# Patient Record
Sex: FEMALE | Race: WHITE | NOT HISPANIC OR LATINO | Employment: UNEMPLOYED | URBAN - METROPOLITAN AREA
[De-identification: names, ages, dates, MRNs, and addresses within clinical notes are randomized per-mention and may not be internally consistent; named-entity substitution may affect disease eponyms.]

---

## 2017-03-26 ENCOUNTER — APPOINTMENT (OUTPATIENT)
Dept: GENERAL RADIOLOGY | Facility: HOSPITAL | Age: 72
End: 2017-03-26

## 2017-03-26 ENCOUNTER — HOSPITAL ENCOUNTER (INPATIENT)
Facility: HOSPITAL | Age: 72
LOS: 13 days | Discharge: NURSING, REHAB OR LONG-TERM CARE | End: 2017-04-08
Attending: EMERGENCY MEDICINE | Admitting: INTERNAL MEDICINE

## 2017-03-26 DIAGNOSIS — J96.02 ACUTE RESPIRATORY FAILURE WITH HYPERCAPNIA (HCC): Primary | ICD-10-CM

## 2017-03-26 DIAGNOSIS — J44.1 COPD EXACERBATION (HCC): ICD-10-CM

## 2017-03-26 LAB
ALBUMIN SERPL-MCNC: 3.8 G/DL (ref 3.5–5.2)
ALBUMIN/GLOB SERPL: 1.4 G/DL
ALP SERPL-CCNC: 68 U/L (ref 39–117)
ALT SERPL W P-5'-P-CCNC: 15 U/L (ref 1–33)
ANION GAP SERPL CALCULATED.3IONS-SCNC: 6.3 MMOL/L
ARTERIAL PATENCY WRIST A: NEGATIVE
ARTERIAL PATENCY WRIST A: POSITIVE
AST SERPL-CCNC: 10 U/L (ref 1–32)
ATMOSPHERIC PRESS: 744.8 MMHG
ATMOSPHERIC PRESS: 750 MMHG
B PERT DNA SPEC QL NAA+PROBE: NOT DETECTED
BASE EXCESS BLDA CALC-SCNC: 10.9 MMOL/L (ref 0–2)
BASE EXCESS BLDA CALC-SCNC: 12.3 MMOL/L (ref 0–2)
BASOPHILS # BLD AUTO: 0.01 10*3/MM3 (ref 0–0.2)
BASOPHILS NFR BLD AUTO: 0.1 % (ref 0–1.5)
BDY SITE: ABNORMAL
BDY SITE: ABNORMAL
BILIRUB SERPL-MCNC: <0.2 MG/DL (ref 0.1–1.2)
BUN BLD-MCNC: 28 MG/DL (ref 8–23)
BUN/CREAT SERPL: 20.6 (ref 7–25)
C PNEUM DNA NPH QL NAA+NON-PROBE: NOT DETECTED
CALCIUM SPEC-SCNC: 8.9 MG/DL (ref 8.6–10.5)
CHLORIDE SERPL-SCNC: 102 MMOL/L (ref 98–107)
CO2 SERPL-SCNC: 40.7 MMOL/L (ref 22–29)
CREAT BLD-MCNC: 1.36 MG/DL (ref 0.57–1)
DEPRECATED RDW RBC AUTO: 52 FL (ref 37–54)
EOSINOPHIL # BLD AUTO: 0.2 10*3/MM3 (ref 0–0.7)
EOSINOPHIL NFR BLD AUTO: 1.8 % (ref 0.3–6.2)
ERYTHROCYTE [DISTWIDTH] IN BLOOD BY AUTOMATED COUNT: 14.2 % (ref 11.7–13)
FLUAV H1 2009 PAND RNA NPH QL NAA+PROBE: NOT DETECTED
FLUAV H1 HA GENE NPH QL NAA+PROBE: NOT DETECTED
FLUAV H3 RNA NPH QL NAA+PROBE: NOT DETECTED
FLUAV SUBTYP SPEC NAA+PROBE: NOT DETECTED
FLUBV RNA ISLT QL NAA+PROBE: NOT DETECTED
GAS FLOW AIRWAY: 4 LPM
GFR SERPL CREATININE-BSD FRML MDRD: 38 ML/MIN/1.73
GLOBULIN UR ELPH-MCNC: 2.8 GM/DL
GLUCOSE BLD-MCNC: 118 MG/DL (ref 65–99)
GLUCOSE BLDC GLUCOMTR-MCNC: 185 MG/DL (ref 70–130)
HADV DNA SPEC NAA+PROBE: NOT DETECTED
HCO3 BLDA-SCNC: 40.1 MMOL/L (ref 22–28)
HCO3 BLDA-SCNC: 41.3 MMOL/L (ref 22–28)
HCOV 229E RNA SPEC QL NAA+PROBE: NOT DETECTED
HCOV HKU1 RNA SPEC QL NAA+PROBE: NOT DETECTED
HCOV NL63 RNA SPEC QL NAA+PROBE: NOT DETECTED
HCOV OC43 RNA SPEC QL NAA+PROBE: NOT DETECTED
HCT VFR BLD AUTO: 39.4 % (ref 35.6–45.5)
HGB BLD-MCNC: 11.2 G/DL (ref 11.9–15.5)
HMPV RNA NPH QL NAA+NON-PROBE: NOT DETECTED
HOLD SPECIMEN: NORMAL
HOLD SPECIMEN: NORMAL
HOROWITZ INDEX BLD+IHG-RTO: 30 %
HPIV1 RNA SPEC QL NAA+PROBE: NOT DETECTED
HPIV2 RNA SPEC QL NAA+PROBE: NOT DETECTED
HPIV3 RNA NPH QL NAA+PROBE: NOT DETECTED
HPIV4 P GENE NPH QL NAA+PROBE: NOT DETECTED
IMM GRANULOCYTES # BLD: 0.07 10*3/MM3 (ref 0–0.03)
IMM GRANULOCYTES NFR BLD: 0.6 % (ref 0–0.5)
INR PPP: 0.97 (ref 0.9–1.1)
LYMPHOCYTES # BLD AUTO: 1.7 10*3/MM3 (ref 0.9–4.8)
LYMPHOCYTES NFR BLD AUTO: 15.1 % (ref 19.6–45.3)
M PNEUMO IGG SER IA-ACNC: NOT DETECTED
MCH RBC QN AUTO: 28.5 PG (ref 26.9–32)
MCHC RBC AUTO-ENTMCNC: 28.4 G/DL (ref 32.4–36.3)
MCV RBC AUTO: 100.3 FL (ref 80.5–98.2)
MODALITY: ABNORMAL
MODALITY: ABNORMAL
MONOCYTES # BLD AUTO: 0.67 10*3/MM3 (ref 0.2–1.2)
MONOCYTES NFR BLD AUTO: 5.9 % (ref 5–12)
NEUTROPHILS # BLD AUTO: 8.64 10*3/MM3 (ref 1.9–8.1)
NEUTROPHILS NFR BLD AUTO: 76.5 % (ref 42.7–76)
NT-PROBNP SERPL-MCNC: 109.9 PG/ML (ref 0–900)
O2 A-A PPRESDIFF RESPIRATORY: 0.5 MMHG
PCO2 BLDA: 74.6 MM HG (ref 35–45)
PCO2 BLDA: 77.4 MM HG (ref 35–45)
PH BLDA: 7.32 PH UNITS (ref 7.35–7.45)
PH BLDA: 7.35 PH UNITS (ref 7.35–7.45)
PLATELET # BLD AUTO: 203 10*3/MM3 (ref 140–500)
PMV BLD AUTO: 10.3 FL (ref 6–12)
PO2 BLDA: 58.1 MM HG (ref 80–100)
PO2 BLDA: 59.1 MM HG (ref 80–100)
POTASSIUM BLD-SCNC: 4.7 MMOL/L (ref 3.5–5.2)
PROCALCITONIN SERPL-MCNC: 0.05 NG/ML (ref 0.1–0.25)
PROT SERPL-MCNC: 6.6 G/DL (ref 6–8.5)
PROTHROMBIN TIME: 12.5 SECONDS (ref 11.7–14.2)
RBC # BLD AUTO: 3.93 10*6/MM3 (ref 3.9–5.2)
RHINOVIRUS RNA SPEC NAA+PROBE: NOT DETECTED
RSV RNA NPH QL NAA+NON-PROBE: NOT DETECTED
SAO2 % BLDCOA: 86.3 % (ref 92–99)
SAO2 % BLDCOA: 86.7 % (ref 92–99)
SET MECH RESP RATE: 18
SET MECH RESP RATE: 22
SODIUM BLD-SCNC: 149 MMOL/L (ref 136–145)
TOTAL RATE: 19 BREATHS/MINUTE
TROPONIN T SERPL-MCNC: <0.01 NG/ML (ref 0–0.03)
VALPROATE SERPL-MCNC: 10 MCG/ML (ref 50–125)
VT ON VENT VENT: 461 ML
WBC NRBC COR # BLD: 11.29 10*3/MM3 (ref 4.5–10.7)
WHOLE BLOOD HOLD SPECIMEN: NORMAL
WHOLE BLOOD HOLD SPECIMEN: NORMAL

## 2017-03-26 PROCEDURE — 85025 COMPLETE CBC W/AUTO DIFF WBC: CPT | Performed by: EMERGENCY MEDICINE

## 2017-03-26 PROCEDURE — 80053 COMPREHEN METABOLIC PANEL: CPT | Performed by: EMERGENCY MEDICINE

## 2017-03-26 PROCEDURE — 80164 ASSAY DIPROPYLACETIC ACD TOT: CPT | Performed by: EMERGENCY MEDICINE

## 2017-03-26 PROCEDURE — 84145 PROCALCITONIN (PCT): CPT | Performed by: EMERGENCY MEDICINE

## 2017-03-26 PROCEDURE — 93010 ELECTROCARDIOGRAM REPORT: CPT | Performed by: INTERNAL MEDICINE

## 2017-03-26 PROCEDURE — 36600 WITHDRAWAL OF ARTERIAL BLOOD: CPT

## 2017-03-26 PROCEDURE — 87798 DETECT AGENT NOS DNA AMP: CPT | Performed by: INTERNAL MEDICINE

## 2017-03-26 PROCEDURE — 87581 M.PNEUMON DNA AMP PROBE: CPT | Performed by: INTERNAL MEDICINE

## 2017-03-26 PROCEDURE — 25010000002 METHYLPREDNISOLONE PER 125 MG: Performed by: EMERGENCY MEDICINE

## 2017-03-26 PROCEDURE — 84484 ASSAY OF TROPONIN QUANT: CPT | Performed by: EMERGENCY MEDICINE

## 2017-03-26 PROCEDURE — 94799 UNLISTED PULMONARY SVC/PX: CPT

## 2017-03-26 PROCEDURE — 25010000002 LEVOFLOXACIN PER 250 MG: Performed by: INTERNAL MEDICINE

## 2017-03-26 PROCEDURE — 99291 CRITICAL CARE FIRST HOUR: CPT

## 2017-03-26 PROCEDURE — 5A09357 ASSISTANCE WITH RESPIRATORY VENTILATION, LESS THAN 24 CONSECUTIVE HOURS, CONTINUOUS POSITIVE AIRWAY PRESSURE: ICD-10-PCS | Performed by: INTERNAL MEDICINE

## 2017-03-26 PROCEDURE — 25010000002 FUROSEMIDE PER 20 MG: Performed by: EMERGENCY MEDICINE

## 2017-03-26 PROCEDURE — 83880 ASSAY OF NATRIURETIC PEPTIDE: CPT | Performed by: EMERGENCY MEDICINE

## 2017-03-26 PROCEDURE — 85610 PROTHROMBIN TIME: CPT | Performed by: EMERGENCY MEDICINE

## 2017-03-26 PROCEDURE — 93005 ELECTROCARDIOGRAM TRACING: CPT | Performed by: EMERGENCY MEDICINE

## 2017-03-26 PROCEDURE — 94660 CPAP INITIATION&MGMT: CPT

## 2017-03-26 PROCEDURE — 25010000002 ENOXAPARIN PER 10 MG: Performed by: INTERNAL MEDICINE

## 2017-03-26 PROCEDURE — 82803 BLOOD GASES ANY COMBINATION: CPT

## 2017-03-26 PROCEDURE — 94640 AIRWAY INHALATION TREATMENT: CPT

## 2017-03-26 PROCEDURE — 87486 CHLMYD PNEUM DNA AMP PROBE: CPT | Performed by: INTERNAL MEDICINE

## 2017-03-26 PROCEDURE — 87633 RESP VIRUS 12-25 TARGETS: CPT | Performed by: INTERNAL MEDICINE

## 2017-03-26 PROCEDURE — 82962 GLUCOSE BLOOD TEST: CPT

## 2017-03-26 PROCEDURE — 71020 HC CHEST PA AND LATERAL: CPT

## 2017-03-26 RX ORDER — DULOXETIN HYDROCHLORIDE 30 MG/1
30 CAPSULE, DELAYED RELEASE ORAL DAILY
Status: DISCONTINUED | OUTPATIENT
Start: 2017-03-26 | End: 2017-04-08 | Stop reason: HOSPADM

## 2017-03-26 RX ORDER — DIVALPROEX SODIUM 125 MG/1
125 CAPSULE, COATED PELLETS ORAL DAILY
Status: DISCONTINUED | OUTPATIENT
Start: 2017-03-26 | End: 2017-04-08 | Stop reason: HOSPADM

## 2017-03-26 RX ORDER — IPRATROPIUM BROMIDE AND ALBUTEROL SULFATE 2.5; .5 MG/3ML; MG/3ML
3 SOLUTION RESPIRATORY (INHALATION) EVERY 4 HOURS PRN
COMMUNITY
End: 2017-03-26

## 2017-03-26 RX ORDER — LEVOTHYROXINE SODIUM 0.05 MG/1
50 TABLET ORAL
Status: DISCONTINUED | OUTPATIENT
Start: 2017-03-27 | End: 2017-04-08 | Stop reason: HOSPADM

## 2017-03-26 RX ORDER — IPRATROPIUM BROMIDE AND ALBUTEROL SULFATE 2.5; .5 MG/3ML; MG/3ML
3 SOLUTION RESPIRATORY (INHALATION)
Status: DISCONTINUED | OUTPATIENT
Start: 2017-03-26 | End: 2017-04-05

## 2017-03-26 RX ORDER — SODIUM CHLORIDE 0.9 % (FLUSH) 0.9 %
10 SYRINGE (ML) INJECTION AS NEEDED
Status: DISCONTINUED | OUTPATIENT
Start: 2017-03-26 | End: 2017-03-29

## 2017-03-26 RX ORDER — DOCUSATE SODIUM 100 MG/1
100 CAPSULE, LIQUID FILLED ORAL 2 TIMES DAILY
Status: DISCONTINUED | OUTPATIENT
Start: 2017-03-26 | End: 2017-04-06

## 2017-03-26 RX ORDER — ROPINIROLE 0.25 MG/1
0.25 TABLET, FILM COATED ORAL 3 TIMES DAILY
COMMUNITY

## 2017-03-26 RX ORDER — PANTOPRAZOLE SODIUM 40 MG/1
40 TABLET, DELAYED RELEASE ORAL
Status: DISCONTINUED | OUTPATIENT
Start: 2017-03-27 | End: 2017-04-08 | Stop reason: HOSPADM

## 2017-03-26 RX ORDER — IPRATROPIUM BROMIDE AND ALBUTEROL SULFATE 2.5; .5 MG/3ML; MG/3ML
3 SOLUTION RESPIRATORY (INHALATION) EVERY 4 HOURS PRN
Status: DISCONTINUED | OUTPATIENT
Start: 2017-03-26 | End: 2017-04-06

## 2017-03-26 RX ORDER — ALBUTEROL SULFATE 2.5 MG/3ML
2.5 SOLUTION RESPIRATORY (INHALATION)
Status: COMPLETED | OUTPATIENT
Start: 2017-03-26 | End: 2017-03-26

## 2017-03-26 RX ORDER — IPRATROPIUM BROMIDE AND ALBUTEROL SULFATE 2.5; .5 MG/3ML; MG/3ML
3 SOLUTION RESPIRATORY (INHALATION)
COMMUNITY
Start: 2016-01-11 | End: 2017-04-08 | Stop reason: HOSPADM

## 2017-03-26 RX ORDER — IPRATROPIUM BROMIDE AND ALBUTEROL SULFATE 2.5; .5 MG/3ML; MG/3ML
3 SOLUTION RESPIRATORY (INHALATION) ONCE
Status: COMPLETED | OUTPATIENT
Start: 2017-03-26 | End: 2017-03-26

## 2017-03-26 RX ORDER — CETIRIZINE HYDROCHLORIDE 10 MG/1
10 TABLET ORAL DAILY
COMMUNITY
End: 2017-04-08 | Stop reason: HOSPADM

## 2017-03-26 RX ORDER — BUMETANIDE 0.25 MG/ML
2 INJECTION INTRAMUSCULAR; INTRAVENOUS EVERY 12 HOURS
Status: COMPLETED | OUTPATIENT
Start: 2017-03-26 | End: 2017-03-27

## 2017-03-26 RX ORDER — LEVOFLOXACIN 5 MG/ML
750 INJECTION, SOLUTION INTRAVENOUS EVERY 24 HOURS
Status: DISCONTINUED | OUTPATIENT
Start: 2017-03-26 | End: 2017-03-27

## 2017-03-26 RX ORDER — ROPINIROLE 0.25 MG/1
0.25 TABLET, FILM COATED ORAL 3 TIMES DAILY
Status: DISCONTINUED | OUTPATIENT
Start: 2017-03-26 | End: 2017-04-08 | Stop reason: HOSPADM

## 2017-03-26 RX ORDER — SIMVASTATIN 10 MG
10 TABLET ORAL NIGHTLY
COMMUNITY

## 2017-03-26 RX ORDER — SODIUM CHLORIDE 0.9 % (FLUSH) 0.9 %
10 SYRINGE (ML) INJECTION AS NEEDED
Status: DISCONTINUED | OUTPATIENT
Start: 2017-03-26 | End: 2017-04-08 | Stop reason: HOSPADM

## 2017-03-26 RX ORDER — FUROSEMIDE 10 MG/ML
40 INJECTION INTRAMUSCULAR; INTRAVENOUS ONCE
Status: COMPLETED | OUTPATIENT
Start: 2017-03-26 | End: 2017-03-26

## 2017-03-26 RX ORDER — AMLODIPINE BESYLATE 5 MG/1
5 TABLET ORAL DAILY
Status: DISCONTINUED | OUTPATIENT
Start: 2017-03-26 | End: 2017-04-08 | Stop reason: HOSPADM

## 2017-03-26 RX ORDER — METHYLPREDNISOLONE SODIUM SUCCINATE 125 MG/2ML
125 INJECTION, POWDER, LYOPHILIZED, FOR SOLUTION INTRAMUSCULAR; INTRAVENOUS ONCE
Status: COMPLETED | OUTPATIENT
Start: 2017-03-26 | End: 2017-03-26

## 2017-03-26 RX ORDER — SODIUM CHLORIDE 0.9 % (FLUSH) 0.9 %
1-10 SYRINGE (ML) INJECTION AS NEEDED
Status: DISCONTINUED | OUTPATIENT
Start: 2017-03-26 | End: 2017-04-08 | Stop reason: HOSPADM

## 2017-03-26 RX ADMIN — DULOXETINE HYDROCHLORIDE 30 MG: 30 CAPSULE, DELAYED RELEASE ORAL at 17:56

## 2017-03-26 RX ADMIN — VANCOMYCIN HYDROCHLORIDE 2500 MG: 1 INJECTION, POWDER, LYOPHILIZED, FOR SOLUTION INTRAVENOUS at 19:34

## 2017-03-26 RX ADMIN — ROPINIROLE HYDROCHLORIDE 0.25 MG: 0.25 TABLET, FILM COATED ORAL at 20:17

## 2017-03-26 RX ADMIN — IPRATROPIUM BROMIDE AND ALBUTEROL SULFATE 3 ML: .5; 3 SOLUTION RESPIRATORY (INHALATION) at 20:16

## 2017-03-26 RX ADMIN — ALBUTEROL SULFATE 2.5 MG: 2.5 SOLUTION RESPIRATORY (INHALATION) at 11:11

## 2017-03-26 RX ADMIN — IPRATROPIUM BROMIDE AND ALBUTEROL SULFATE 3 ML: .5; 3 SOLUTION RESPIRATORY (INHALATION) at 23:28

## 2017-03-26 RX ADMIN — DOCUSATE SODIUM 100 MG: 100 CAPSULE, LIQUID FILLED ORAL at 17:55

## 2017-03-26 RX ADMIN — IPRATROPIUM BROMIDE AND ALBUTEROL SULFATE 3 ML: .5; 3 SOLUTION RESPIRATORY (INHALATION) at 17:11

## 2017-03-26 RX ADMIN — ENOXAPARIN SODIUM 40 MG: 40 INJECTION SUBCUTANEOUS at 20:17

## 2017-03-26 RX ADMIN — BUMETANIDE 2 MG: 0.25 INJECTION, SOLUTION INTRAMUSCULAR; INTRAVENOUS at 17:55

## 2017-03-26 RX ADMIN — METHYLPREDNISOLONE SODIUM SUCCINATE 125 MG: 125 INJECTION, POWDER, FOR SOLUTION INTRAMUSCULAR; INTRAVENOUS at 11:59

## 2017-03-26 RX ADMIN — FUROSEMIDE 40 MG: 10 INJECTION, SOLUTION INTRAMUSCULAR; INTRAVENOUS at 12:20

## 2017-03-26 RX ADMIN — AMLODIPINE BESYLATE 5 MG: 5 TABLET ORAL at 17:56

## 2017-03-26 RX ADMIN — ALBUTEROL SULFATE 2.5 MG: 2.5 SOLUTION RESPIRATORY (INHALATION) at 12:03

## 2017-03-26 RX ADMIN — IPRATROPIUM BROMIDE AND ALBUTEROL SULFATE 3 ML: .5; 3 SOLUTION RESPIRATORY (INHALATION) at 11:11

## 2017-03-26 RX ADMIN — ROPINIROLE HYDROCHLORIDE 0.25 MG: 0.25 TABLET, FILM COATED ORAL at 17:56

## 2017-03-26 RX ADMIN — LEVOFLOXACIN 750 MG: 5 INJECTION, SOLUTION INTRAVENOUS at 17:55

## 2017-03-26 RX ADMIN — DIVALPROEX SODIUM 125 MG: 125 CAPSULE, COATED PELLETS ORAL at 17:55

## 2017-03-27 ENCOUNTER — APPOINTMENT (OUTPATIENT)
Dept: GENERAL RADIOLOGY | Facility: HOSPITAL | Age: 72
End: 2017-03-27

## 2017-03-27 LAB
ANION GAP SERPL CALCULATED.3IONS-SCNC: 9.3 MMOL/L
ARTERIAL PATENCY WRIST A: POSITIVE
ARTERIAL PATENCY WRIST A: POSITIVE
ATMOSPHERIC PRESS: 745.7 MMHG
ATMOSPHERIC PRESS: 746.3 MMHG
BASE EXCESS BLDA CALC-SCNC: 19.4 MMOL/L (ref 0–2)
BASE EXCESS BLDA CALC-SCNC: 20.6 MMOL/L (ref 0–2)
BDY SITE: ABNORMAL
BDY SITE: ABNORMAL
BUN BLD-MCNC: 31 MG/DL (ref 8–23)
BUN/CREAT SERPL: 26.3 (ref 7–25)
CALCIUM SPEC-SCNC: 8.8 MG/DL (ref 8.6–10.5)
CHLORIDE SERPL-SCNC: 98 MMOL/L (ref 98–107)
CO2 SERPL-SCNC: 41.7 MMOL/L (ref 22–29)
CREAT BLD-MCNC: 1.18 MG/DL (ref 0.57–1)
DEPRECATED RDW RBC AUTO: 48.7 FL (ref 37–54)
ERYTHROCYTE [DISTWIDTH] IN BLOOD BY AUTOMATED COUNT: 13.9 % (ref 11.7–13)
GAS FLOW AIRWAY: 4 LPM
GFR SERPL CREATININE-BSD FRML MDRD: 45 ML/MIN/1.73
GLUCOSE BLD-MCNC: 96 MG/DL (ref 65–99)
GLUCOSE BLDC GLUCOMTR-MCNC: 95 MG/DL (ref 70–130)
GLUCOSE BLDC GLUCOMTR-MCNC: 99 MG/DL (ref 70–130)
HCO3 BLDA-SCNC: 45.8 MMOL/L (ref 22–28)
HCO3 BLDA-SCNC: 47.7 MMOL/L (ref 22–28)
HCT VFR BLD AUTO: 36.8 % (ref 35.6–45.5)
HGB BLD-MCNC: 10.5 G/DL (ref 11.9–15.5)
HOROWITZ INDEX BLD+IHG-RTO: 40 %
MCH RBC QN AUTO: 27.7 PG (ref 26.9–32)
MCHC RBC AUTO-ENTMCNC: 28.5 G/DL (ref 32.4–36.3)
MCV RBC AUTO: 97.1 FL (ref 80.5–98.2)
MODALITY: ABNORMAL
MODALITY: ABNORMAL
NT-PROBNP SERPL-MCNC: 91.5 PG/ML (ref 5–900)
O2 A-A PPRESDIFF RESPIRATORY: 0.4 MMHG
PCO2 BLDA: 60 MM HG (ref 35–45)
PCO2 BLDA: 65 MM HG (ref 35–45)
PH BLDA: 7.47 PH UNITS (ref 7.35–7.45)
PH BLDA: 7.49 PH UNITS (ref 7.35–7.45)
PLATELET # BLD AUTO: 192 10*3/MM3 (ref 140–500)
PMV BLD AUTO: 10.7 FL (ref 6–12)
PO2 BLDA: 61.4 MM HG (ref 80–100)
PO2 BLDA: 87 MM HG (ref 80–100)
POTASSIUM BLD-SCNC: 4.1 MMOL/L (ref 3.5–5.2)
PROCALCITONIN SERPL-MCNC: 0.05 NG/ML (ref 0.1–0.25)
RBC # BLD AUTO: 3.79 10*6/MM3 (ref 3.9–5.2)
SAO2 % BLDCOA: 92 % (ref 92–99)
SAO2 % BLDCOA: 96.8 % (ref 92–99)
SET MECH RESP RATE: 18
SODIUM BLD-SCNC: 149 MMOL/L (ref 136–145)
TOTAL RATE: 20 BREATHS/MINUTE
TOTAL RATE: 21 BREATHS/MINUTE
VANCOMYCIN SERPL-MCNC: 18.1 MCG/ML (ref 5–40)
VT ON VENT VENT: 424 ML
WBC NRBC COR # BLD: 9.12 10*3/MM3 (ref 4.5–10.7)

## 2017-03-27 PROCEDURE — 83880 ASSAY OF NATRIURETIC PEPTIDE: CPT | Performed by: INTERNAL MEDICINE

## 2017-03-27 PROCEDURE — 94799 UNLISTED PULMONARY SVC/PX: CPT

## 2017-03-27 PROCEDURE — 82962 GLUCOSE BLOOD TEST: CPT

## 2017-03-27 PROCEDURE — 36600 WITHDRAWAL OF ARTERIAL BLOOD: CPT

## 2017-03-27 PROCEDURE — 94660 CPAP INITIATION&MGMT: CPT

## 2017-03-27 PROCEDURE — 71010 HC CHEST PA OR AP: CPT

## 2017-03-27 PROCEDURE — 80202 ASSAY OF VANCOMYCIN: CPT | Performed by: INTERNAL MEDICINE

## 2017-03-27 PROCEDURE — 82803 BLOOD GASES ANY COMBINATION: CPT

## 2017-03-27 PROCEDURE — 85027 COMPLETE CBC AUTOMATED: CPT | Performed by: INTERNAL MEDICINE

## 2017-03-27 PROCEDURE — 84145 PROCALCITONIN (PCT): CPT | Performed by: INTERNAL MEDICINE

## 2017-03-27 PROCEDURE — 80048 BASIC METABOLIC PNL TOTAL CA: CPT | Performed by: INTERNAL MEDICINE

## 2017-03-27 PROCEDURE — 25010000002 ENOXAPARIN PER 10 MG: Performed by: INTERNAL MEDICINE

## 2017-03-27 RX ORDER — ACETAMINOPHEN 325 MG/1
650 TABLET ORAL EVERY 4 HOURS PRN
Status: DISCONTINUED | OUTPATIENT
Start: 2017-03-27 | End: 2017-04-08 | Stop reason: HOSPADM

## 2017-03-27 RX ORDER — BUMETANIDE 0.25 MG/ML
2 INJECTION INTRAMUSCULAR; INTRAVENOUS EVERY 12 HOURS
Status: COMPLETED | OUTPATIENT
Start: 2017-03-27 | End: 2017-03-28

## 2017-03-27 RX ADMIN — ROPINIROLE HYDROCHLORIDE 0.25 MG: 0.25 TABLET, FILM COATED ORAL at 16:33

## 2017-03-27 RX ADMIN — ROPINIROLE HYDROCHLORIDE 0.25 MG: 0.25 TABLET, FILM COATED ORAL at 20:40

## 2017-03-27 RX ADMIN — IPRATROPIUM BROMIDE AND ALBUTEROL SULFATE 3 ML: .5; 3 SOLUTION RESPIRATORY (INHALATION) at 03:27

## 2017-03-27 RX ADMIN — ACETAMINOPHEN 650 MG: 325 TABLET ORAL at 13:35

## 2017-03-27 RX ADMIN — DULOXETINE HYDROCHLORIDE 30 MG: 30 CAPSULE, DELAYED RELEASE ORAL at 08:26

## 2017-03-27 RX ADMIN — DIVALPROEX SODIUM 125 MG: 125 CAPSULE, COATED PELLETS ORAL at 08:26

## 2017-03-27 RX ADMIN — ACETAMINOPHEN 650 MG: 325 TABLET ORAL at 20:40

## 2017-03-27 RX ADMIN — IPRATROPIUM BROMIDE AND ALBUTEROL SULFATE 3 ML: .5; 3 SOLUTION RESPIRATORY (INHALATION) at 20:25

## 2017-03-27 RX ADMIN — IPRATROPIUM BROMIDE AND ALBUTEROL SULFATE 3 ML: .5; 3 SOLUTION RESPIRATORY (INHALATION) at 11:54

## 2017-03-27 RX ADMIN — DOCUSATE SODIUM 100 MG: 100 CAPSULE, LIQUID FILLED ORAL at 08:26

## 2017-03-27 RX ADMIN — LEVOTHYROXINE SODIUM 50 MCG: 50 TABLET ORAL at 06:09

## 2017-03-27 RX ADMIN — IPRATROPIUM BROMIDE AND ALBUTEROL SULFATE 3 ML: .5; 3 SOLUTION RESPIRATORY (INHALATION) at 07:37

## 2017-03-27 RX ADMIN — IPRATROPIUM BROMIDE AND ALBUTEROL SULFATE 3 ML: .5; 3 SOLUTION RESPIRATORY (INHALATION) at 16:02

## 2017-03-27 RX ADMIN — AMLODIPINE BESYLATE 5 MG: 5 TABLET ORAL at 08:26

## 2017-03-27 RX ADMIN — ENOXAPARIN SODIUM 40 MG: 40 INJECTION SUBCUTANEOUS at 09:15

## 2017-03-27 RX ADMIN — DOCUSATE SODIUM 100 MG: 100 CAPSULE, LIQUID FILLED ORAL at 19:03

## 2017-03-27 RX ADMIN — ENOXAPARIN SODIUM 40 MG: 40 INJECTION SUBCUTANEOUS at 20:40

## 2017-03-27 RX ADMIN — ROPINIROLE HYDROCHLORIDE 0.25 MG: 0.25 TABLET, FILM COATED ORAL at 08:26

## 2017-03-27 RX ADMIN — PANTOPRAZOLE SODIUM 40 MG: 40 TABLET, DELAYED RELEASE ORAL at 06:09

## 2017-03-27 RX ADMIN — BUMETANIDE 2 MG: 0.25 INJECTION, SOLUTION INTRAMUSCULAR; INTRAVENOUS at 05:37

## 2017-03-27 RX ADMIN — BUMETANIDE 2 MG: 0.25 INJECTION, SOLUTION INTRAMUSCULAR; INTRAVENOUS at 16:33

## 2017-03-28 LAB
ANION GAP SERPL CALCULATED.3IONS-SCNC: 13.7 MMOL/L
BUN BLD-MCNC: 27 MG/DL (ref 8–23)
BUN/CREAT SERPL: 22.5 (ref 7–25)
CALCIUM SPEC-SCNC: 9.1 MG/DL (ref 8.6–10.5)
CHLORIDE SERPL-SCNC: 91 MMOL/L (ref 98–107)
CO2 SERPL-SCNC: 41.3 MMOL/L (ref 22–29)
CREAT BLD-MCNC: 1.2 MG/DL (ref 0.57–1)
GFR SERPL CREATININE-BSD FRML MDRD: 44 ML/MIN/1.73
GLUCOSE BLD-MCNC: 98 MG/DL (ref 65–99)
MAGNESIUM SERPL-MCNC: 1.8 MG/DL (ref 1.6–2.4)
POTASSIUM BLD-SCNC: 3.3 MMOL/L (ref 3.5–5.2)
SODIUM BLD-SCNC: 146 MMOL/L (ref 136–145)

## 2017-03-28 PROCEDURE — 94660 CPAP INITIATION&MGMT: CPT

## 2017-03-28 PROCEDURE — 80048 BASIC METABOLIC PNL TOTAL CA: CPT | Performed by: INTERNAL MEDICINE

## 2017-03-28 PROCEDURE — 94799 UNLISTED PULMONARY SVC/PX: CPT

## 2017-03-28 PROCEDURE — 25010000002 ENOXAPARIN PER 10 MG: Performed by: INTERNAL MEDICINE

## 2017-03-28 PROCEDURE — 83735 ASSAY OF MAGNESIUM: CPT | Performed by: INTERNAL MEDICINE

## 2017-03-28 RX ORDER — MAGNESIUM SULFATE HEPTAHYDRATE 40 MG/ML
2 INJECTION, SOLUTION INTRAVENOUS AS NEEDED
Status: DISCONTINUED | OUTPATIENT
Start: 2017-03-28 | End: 2017-04-08 | Stop reason: HOSPADM

## 2017-03-28 RX ORDER — POTASSIUM CHLORIDE 750 MG/1
40 CAPSULE, EXTENDED RELEASE ORAL AS NEEDED
Status: DISCONTINUED | OUTPATIENT
Start: 2017-03-28 | End: 2017-04-08 | Stop reason: HOSPADM

## 2017-03-28 RX ORDER — POTASSIUM CHLORIDE 1.5 G/1.77G
40 POWDER, FOR SOLUTION ORAL AS NEEDED
Status: DISCONTINUED | OUTPATIENT
Start: 2017-03-28 | End: 2017-03-29 | Stop reason: CLARIF

## 2017-03-28 RX ORDER — MAGNESIUM SULFATE HEPTAHYDRATE 40 MG/ML
4 INJECTION, SOLUTION INTRAVENOUS AS NEEDED
Status: DISCONTINUED | OUTPATIENT
Start: 2017-03-28 | End: 2017-04-08 | Stop reason: HOSPADM

## 2017-03-28 RX ORDER — BUMETANIDE 1 MG/1
1 TABLET ORAL 2 TIMES DAILY
Status: DISCONTINUED | OUTPATIENT
Start: 2017-03-28 | End: 2017-03-31

## 2017-03-28 RX ORDER — POTASSIUM CHLORIDE 7.45 MG/ML
10 INJECTION INTRAVENOUS
Status: DISCONTINUED | OUTPATIENT
Start: 2017-03-28 | End: 2017-04-08 | Stop reason: HOSPADM

## 2017-03-28 RX ADMIN — IPRATROPIUM BROMIDE AND ALBUTEROL SULFATE 3 ML: .5; 3 SOLUTION RESPIRATORY (INHALATION) at 07:46

## 2017-03-28 RX ADMIN — IPRATROPIUM BROMIDE AND ALBUTEROL SULFATE 3 ML: .5; 3 SOLUTION RESPIRATORY (INHALATION) at 11:58

## 2017-03-28 RX ADMIN — DIVALPROEX SODIUM 125 MG: 125 CAPSULE, COATED PELLETS ORAL at 09:19

## 2017-03-28 RX ADMIN — ROPINIROLE HYDROCHLORIDE 0.25 MG: 0.25 TABLET, FILM COATED ORAL at 20:02

## 2017-03-28 RX ADMIN — IPRATROPIUM BROMIDE AND ALBUTEROL SULFATE 3 ML: .5; 3 SOLUTION RESPIRATORY (INHALATION) at 03:38

## 2017-03-28 RX ADMIN — IPRATROPIUM BROMIDE AND ALBUTEROL SULFATE 3 ML: .5; 3 SOLUTION RESPIRATORY (INHALATION) at 20:32

## 2017-03-28 RX ADMIN — ROPINIROLE HYDROCHLORIDE 0.25 MG: 0.25 TABLET, FILM COATED ORAL at 09:19

## 2017-03-28 RX ADMIN — ACETAMINOPHEN 650 MG: 325 TABLET ORAL at 15:28

## 2017-03-28 RX ADMIN — ENOXAPARIN SODIUM 40 MG: 40 INJECTION SUBCUTANEOUS at 10:06

## 2017-03-28 RX ADMIN — BUMETANIDE 1 MG: 1 TABLET ORAL at 17:38

## 2017-03-28 RX ADMIN — PANTOPRAZOLE SODIUM 40 MG: 40 TABLET, DELAYED RELEASE ORAL at 05:36

## 2017-03-28 RX ADMIN — DOCUSATE SODIUM 100 MG: 100 CAPSULE, LIQUID FILLED ORAL at 17:38

## 2017-03-28 RX ADMIN — POTASSIUM CHLORIDE 40 MEQ: 750 CAPSULE, EXTENDED RELEASE ORAL at 15:12

## 2017-03-28 RX ADMIN — IPRATROPIUM BROMIDE AND ALBUTEROL SULFATE 3 ML: .5; 3 SOLUTION RESPIRATORY (INHALATION) at 15:28

## 2017-03-28 RX ADMIN — IPRATROPIUM BROMIDE AND ALBUTEROL SULFATE 3 ML: .5; 3 SOLUTION RESPIRATORY (INHALATION) at 00:01

## 2017-03-28 RX ADMIN — ACETAMINOPHEN 650 MG: 325 TABLET ORAL at 20:02

## 2017-03-28 RX ADMIN — ROPINIROLE HYDROCHLORIDE 0.25 MG: 0.25 TABLET, FILM COATED ORAL at 17:38

## 2017-03-28 RX ADMIN — ENOXAPARIN SODIUM 40 MG: 40 INJECTION SUBCUTANEOUS at 20:01

## 2017-03-28 RX ADMIN — LEVOTHYROXINE SODIUM 50 MCG: 50 TABLET ORAL at 05:36

## 2017-03-28 RX ADMIN — DOCUSATE SODIUM 100 MG: 100 CAPSULE, LIQUID FILLED ORAL at 09:19

## 2017-03-28 RX ADMIN — AMLODIPINE BESYLATE 5 MG: 5 TABLET ORAL at 09:19

## 2017-03-28 RX ADMIN — BUMETANIDE 2 MG: 0.25 INJECTION, SOLUTION INTRAMUSCULAR; INTRAVENOUS at 05:13

## 2017-03-28 RX ADMIN — DULOXETINE HYDROCHLORIDE 30 MG: 30 CAPSULE, DELAYED RELEASE ORAL at 09:19

## 2017-03-29 LAB — POTASSIUM BLD-SCNC: 3.4 MMOL/L (ref 3.5–5.2)

## 2017-03-29 PROCEDURE — 25010000002 ONDANSETRON PER 1 MG: Performed by: INTERNAL MEDICINE

## 2017-03-29 PROCEDURE — 94799 UNLISTED PULMONARY SVC/PX: CPT

## 2017-03-29 PROCEDURE — 84132 ASSAY OF SERUM POTASSIUM: CPT | Performed by: INTERNAL MEDICINE

## 2017-03-29 PROCEDURE — 25010000002 ENOXAPARIN PER 10 MG: Performed by: INTERNAL MEDICINE

## 2017-03-29 RX ORDER — ONDANSETRON 2 MG/ML
4 INJECTION INTRAMUSCULAR; INTRAVENOUS EVERY 6 HOURS PRN
Status: DISCONTINUED | OUTPATIENT
Start: 2017-03-29 | End: 2017-04-06

## 2017-03-29 RX ADMIN — AMLODIPINE BESYLATE 5 MG: 5 TABLET ORAL at 09:17

## 2017-03-29 RX ADMIN — IPRATROPIUM BROMIDE AND ALBUTEROL SULFATE 3 ML: .5; 3 SOLUTION RESPIRATORY (INHALATION) at 10:11

## 2017-03-29 RX ADMIN — ENOXAPARIN SODIUM 40 MG: 40 INJECTION SUBCUTANEOUS at 21:41

## 2017-03-29 RX ADMIN — DOCUSATE SODIUM 100 MG: 100 CAPSULE, LIQUID FILLED ORAL at 09:17

## 2017-03-29 RX ADMIN — BUMETANIDE 1 MG: 1 TABLET ORAL at 16:46

## 2017-03-29 RX ADMIN — ROPINIROLE HYDROCHLORIDE 0.25 MG: 0.25 TABLET, FILM COATED ORAL at 16:46

## 2017-03-29 RX ADMIN — IPRATROPIUM BROMIDE AND ALBUTEROL SULFATE 3 ML: .5; 3 SOLUTION RESPIRATORY (INHALATION) at 07:18

## 2017-03-29 RX ADMIN — IPRATROPIUM BROMIDE AND ALBUTEROL SULFATE 3 ML: .5; 3 SOLUTION RESPIRATORY (INHALATION) at 00:30

## 2017-03-29 RX ADMIN — ONDANSETRON 4 MG: 2 INJECTION INTRAMUSCULAR; INTRAVENOUS at 14:44

## 2017-03-29 RX ADMIN — ACETAMINOPHEN 650 MG: 325 TABLET ORAL at 15:22

## 2017-03-29 RX ADMIN — IPRATROPIUM BROMIDE AND ALBUTEROL SULFATE 3 ML: .5; 3 SOLUTION RESPIRATORY (INHALATION) at 04:23

## 2017-03-29 RX ADMIN — ENOXAPARIN SODIUM 40 MG: 40 INJECTION SUBCUTANEOUS at 09:17

## 2017-03-29 RX ADMIN — BUMETANIDE 1 MG: 1 TABLET ORAL at 09:17

## 2017-03-29 RX ADMIN — IPRATROPIUM BROMIDE AND ALBUTEROL SULFATE 3 ML: .5; 3 SOLUTION RESPIRATORY (INHALATION) at 14:33

## 2017-03-29 RX ADMIN — LEVOTHYROXINE SODIUM 50 MCG: 50 TABLET ORAL at 06:06

## 2017-03-29 RX ADMIN — ROPINIROLE HYDROCHLORIDE 0.25 MG: 0.25 TABLET, FILM COATED ORAL at 09:17

## 2017-03-29 RX ADMIN — ROPINIROLE HYDROCHLORIDE 0.25 MG: 0.25 TABLET, FILM COATED ORAL at 21:41

## 2017-03-29 RX ADMIN — DOCUSATE SODIUM 100 MG: 100 CAPSULE, LIQUID FILLED ORAL at 16:46

## 2017-03-29 RX ADMIN — POTASSIUM CHLORIDE 40 MEQ: 750 CAPSULE, EXTENDED RELEASE ORAL at 14:45

## 2017-03-29 RX ADMIN — DIVALPROEX SODIUM 125 MG: 125 CAPSULE, COATED PELLETS ORAL at 09:17

## 2017-03-29 RX ADMIN — PANTOPRAZOLE SODIUM 40 MG: 40 TABLET, DELAYED RELEASE ORAL at 06:06

## 2017-03-29 RX ADMIN — IPRATROPIUM BROMIDE AND ALBUTEROL SULFATE 3 ML: .5; 3 SOLUTION RESPIRATORY (INHALATION) at 23:04

## 2017-03-29 RX ADMIN — DULOXETINE HYDROCHLORIDE 30 MG: 30 CAPSULE, DELAYED RELEASE ORAL at 09:17

## 2017-03-30 PROCEDURE — 94799 UNLISTED PULMONARY SVC/PX: CPT

## 2017-03-30 PROCEDURE — 94660 CPAP INITIATION&MGMT: CPT

## 2017-03-30 PROCEDURE — 25010000002 ENOXAPARIN PER 10 MG: Performed by: INTERNAL MEDICINE

## 2017-03-30 RX ADMIN — IPRATROPIUM BROMIDE AND ALBUTEROL SULFATE 3 ML: .5; 3 SOLUTION RESPIRATORY (INHALATION) at 15:02

## 2017-03-30 RX ADMIN — POTASSIUM CHLORIDE 40 MEQ: 750 CAPSULE, EXTENDED RELEASE ORAL at 12:31

## 2017-03-30 RX ADMIN — IPRATROPIUM BROMIDE AND ALBUTEROL SULFATE 3 ML: .5; 3 SOLUTION RESPIRATORY (INHALATION) at 03:44

## 2017-03-30 RX ADMIN — BUMETANIDE 1 MG: 1 TABLET ORAL at 08:44

## 2017-03-30 RX ADMIN — ENOXAPARIN SODIUM 40 MG: 40 INJECTION SUBCUTANEOUS at 20:29

## 2017-03-30 RX ADMIN — AMLODIPINE BESYLATE 5 MG: 5 TABLET ORAL at 08:44

## 2017-03-30 RX ADMIN — ENOXAPARIN SODIUM 40 MG: 40 INJECTION SUBCUTANEOUS at 08:45

## 2017-03-30 RX ADMIN — ACETAMINOPHEN 650 MG: 325 TABLET ORAL at 12:30

## 2017-03-30 RX ADMIN — PANTOPRAZOLE SODIUM 40 MG: 40 TABLET, DELAYED RELEASE ORAL at 07:37

## 2017-03-30 RX ADMIN — IPRATROPIUM BROMIDE AND ALBUTEROL SULFATE 3 ML: .5; 3 SOLUTION RESPIRATORY (INHALATION) at 10:18

## 2017-03-30 RX ADMIN — POTASSIUM CHLORIDE 40 MEQ: 750 CAPSULE, EXTENDED RELEASE ORAL at 08:44

## 2017-03-30 RX ADMIN — BUMETANIDE 1 MG: 1 TABLET ORAL at 19:02

## 2017-03-30 RX ADMIN — DULOXETINE HYDROCHLORIDE 30 MG: 30 CAPSULE, DELAYED RELEASE ORAL at 08:44

## 2017-03-30 RX ADMIN — ROPINIROLE HYDROCHLORIDE 0.25 MG: 0.25 TABLET, FILM COATED ORAL at 16:19

## 2017-03-30 RX ADMIN — IPRATROPIUM BROMIDE AND ALBUTEROL SULFATE 3 ML: .5; 3 SOLUTION RESPIRATORY (INHALATION) at 19:18

## 2017-03-30 RX ADMIN — DIVALPROEX SODIUM 125 MG: 125 CAPSULE, COATED PELLETS ORAL at 08:44

## 2017-03-30 RX ADMIN — IPRATROPIUM BROMIDE AND ALBUTEROL SULFATE 3 ML: .5; 3 SOLUTION RESPIRATORY (INHALATION) at 06:43

## 2017-03-30 RX ADMIN — ROPINIROLE HYDROCHLORIDE 0.25 MG: 0.25 TABLET, FILM COATED ORAL at 20:30

## 2017-03-30 RX ADMIN — ROPINIROLE HYDROCHLORIDE 0.25 MG: 0.25 TABLET, FILM COATED ORAL at 08:47

## 2017-03-30 RX ADMIN — DOCUSATE SODIUM 100 MG: 100 CAPSULE, LIQUID FILLED ORAL at 08:45

## 2017-03-30 RX ADMIN — LEVOTHYROXINE SODIUM 50 MCG: 50 TABLET ORAL at 07:37

## 2017-03-31 LAB
ANION GAP SERPL CALCULATED.3IONS-SCNC: 12.2 MMOL/L
BACTERIA UR QL AUTO: NORMAL /HPF
BILIRUB UR QL STRIP: NEGATIVE
BUN BLD-MCNC: 18 MG/DL (ref 8–23)
BUN/CREAT SERPL: 11.6 (ref 7–25)
CALCIUM SPEC-SCNC: 8.9 MG/DL (ref 8.6–10.5)
CHLORIDE SERPL-SCNC: 93 MMOL/L (ref 98–107)
CLARITY UR: CLEAR
CO2 SERPL-SCNC: 36.8 MMOL/L (ref 22–29)
COLOR UR: YELLOW
CREAT BLD-MCNC: 1.55 MG/DL (ref 0.57–1)
GFR SERPL CREATININE-BSD FRML MDRD: 33 ML/MIN/1.73
GLUCOSE BLD-MCNC: 128 MG/DL (ref 65–99)
GLUCOSE UR STRIP-MCNC: NEGATIVE MG/DL
HGB UR QL STRIP.AUTO: ABNORMAL
HYALINE CASTS UR QL AUTO: NORMAL /LPF
KETONES UR QL STRIP: NEGATIVE
LEUKOCYTE ESTERASE UR QL STRIP.AUTO: NEGATIVE
NITRITE UR QL STRIP: NEGATIVE
PH UR STRIP.AUTO: 6 [PH] (ref 5–8)
POTASSIUM BLD-SCNC: 3.8 MMOL/L (ref 3.5–5.2)
PROT UR QL STRIP: NEGATIVE
RBC # UR: NORMAL /HPF
REF LAB TEST METHOD: NORMAL
SODIUM BLD-SCNC: 142 MMOL/L (ref 136–145)
SP GR UR STRIP: 1.02 (ref 1–1.03)
SQUAMOUS #/AREA URNS HPF: NORMAL /HPF
UROBILINOGEN UR QL STRIP: ABNORMAL
WBC UR QL AUTO: NORMAL /HPF

## 2017-03-31 PROCEDURE — 94799 UNLISTED PULMONARY SVC/PX: CPT

## 2017-03-31 PROCEDURE — 87086 URINE CULTURE/COLONY COUNT: CPT | Performed by: INTERNAL MEDICINE

## 2017-03-31 PROCEDURE — 87040 BLOOD CULTURE FOR BACTERIA: CPT | Performed by: INTERNAL MEDICINE

## 2017-03-31 PROCEDURE — 25010000002 ENOXAPARIN PER 10 MG: Performed by: INTERNAL MEDICINE

## 2017-03-31 PROCEDURE — 81001 URINALYSIS AUTO W/SCOPE: CPT | Performed by: INTERNAL MEDICINE

## 2017-03-31 PROCEDURE — 80048 BASIC METABOLIC PNL TOTAL CA: CPT | Performed by: INTERNAL MEDICINE

## 2017-03-31 PROCEDURE — 25010000002 LEVOFLOXACIN PER 250 MG: Performed by: INTERNAL MEDICINE

## 2017-03-31 RX ORDER — LEVOFLOXACIN 5 MG/ML
750 INJECTION, SOLUTION INTRAVENOUS
Status: DISCONTINUED | OUTPATIENT
Start: 2017-03-31 | End: 2017-04-02

## 2017-03-31 RX ORDER — LEVOFLOXACIN 5 MG/ML
750 INJECTION, SOLUTION INTRAVENOUS EVERY 24 HOURS
Status: DISCONTINUED | OUTPATIENT
Start: 2017-03-31 | End: 2017-03-31

## 2017-03-31 RX ORDER — BUMETANIDE 1 MG/1
1 TABLET ORAL DAILY
Status: DISCONTINUED | OUTPATIENT
Start: 2017-04-01 | End: 2017-04-08 | Stop reason: HOSPADM

## 2017-03-31 RX ORDER — LEVOFLOXACIN 5 MG/ML
750 INJECTION, SOLUTION INTRAVENOUS
Status: DISCONTINUED | OUTPATIENT
Start: 2017-04-02 | End: 2017-03-31

## 2017-03-31 RX ADMIN — IPRATROPIUM BROMIDE AND ALBUTEROL SULFATE 3 ML: .5; 3 SOLUTION RESPIRATORY (INHALATION) at 00:20

## 2017-03-31 RX ADMIN — PANTOPRAZOLE SODIUM 40 MG: 40 TABLET, DELAYED RELEASE ORAL at 07:12

## 2017-03-31 RX ADMIN — ENOXAPARIN SODIUM 40 MG: 40 INJECTION SUBCUTANEOUS at 08:57

## 2017-03-31 RX ADMIN — ROPINIROLE HYDROCHLORIDE 0.25 MG: 0.25 TABLET, FILM COATED ORAL at 08:56

## 2017-03-31 RX ADMIN — VANCOMYCIN HYDROCHLORIDE 2500 MG: 1 INJECTION, POWDER, LYOPHILIZED, FOR SOLUTION INTRAVENOUS at 16:28

## 2017-03-31 RX ADMIN — BUMETANIDE 1 MG: 1 TABLET ORAL at 08:56

## 2017-03-31 RX ADMIN — IPRATROPIUM BROMIDE AND ALBUTEROL SULFATE 3 ML: .5; 3 SOLUTION RESPIRATORY (INHALATION) at 03:13

## 2017-03-31 RX ADMIN — LEVOTHYROXINE SODIUM 50 MCG: 50 TABLET ORAL at 07:12

## 2017-03-31 RX ADMIN — AMLODIPINE BESYLATE 5 MG: 5 TABLET ORAL at 08:56

## 2017-03-31 RX ADMIN — ROPINIROLE HYDROCHLORIDE 0.25 MG: 0.25 TABLET, FILM COATED ORAL at 15:25

## 2017-03-31 RX ADMIN — DIVALPROEX SODIUM 125 MG: 125 CAPSULE, COATED PELLETS ORAL at 08:56

## 2017-03-31 RX ADMIN — IPRATROPIUM BROMIDE AND ALBUTEROL SULFATE 3 ML: .5; 3 SOLUTION RESPIRATORY (INHALATION) at 15:47

## 2017-03-31 RX ADMIN — ROPINIROLE HYDROCHLORIDE 0.25 MG: 0.25 TABLET, FILM COATED ORAL at 20:32

## 2017-03-31 RX ADMIN — IPRATROPIUM BROMIDE AND ALBUTEROL SULFATE 3 ML: .5; 3 SOLUTION RESPIRATORY (INHALATION) at 19:18

## 2017-03-31 RX ADMIN — LEVOFLOXACIN 750 MG: 5 INJECTION, SOLUTION INTRAVENOUS at 13:31

## 2017-03-31 RX ADMIN — IPRATROPIUM BROMIDE AND ALBUTEROL SULFATE 3 ML: .5; 3 SOLUTION RESPIRATORY (INHALATION) at 23:54

## 2017-03-31 RX ADMIN — IPRATROPIUM BROMIDE AND ALBUTEROL SULFATE 3 ML: .5; 3 SOLUTION RESPIRATORY (INHALATION) at 11:58

## 2017-03-31 RX ADMIN — DULOXETINE HYDROCHLORIDE 30 MG: 30 CAPSULE, DELAYED RELEASE ORAL at 08:56

## 2017-03-31 RX ADMIN — DOCUSATE SODIUM 100 MG: 100 CAPSULE, LIQUID FILLED ORAL at 17:15

## 2017-03-31 RX ADMIN — IPRATROPIUM BROMIDE AND ALBUTEROL SULFATE 3 ML: .5; 3 SOLUTION RESPIRATORY (INHALATION) at 07:58

## 2017-03-31 RX ADMIN — ENOXAPARIN SODIUM 40 MG: 40 INJECTION SUBCUTANEOUS at 21:15

## 2017-04-01 LAB
BASOPHILS # BLD AUTO: 0.01 10*3/MM3 (ref 0–0.2)
BASOPHILS NFR BLD AUTO: 0.2 % (ref 0–1.5)
CREAT BLD-MCNC: 1.47 MG/DL (ref 0.57–1)
DEPRECATED RDW RBC AUTO: 51.3 FL (ref 37–54)
EOSINOPHIL # BLD AUTO: 0.22 10*3/MM3 (ref 0–0.7)
EOSINOPHIL NFR BLD AUTO: 5.1 % (ref 0.3–6.2)
ERYTHROCYTE [DISTWIDTH] IN BLOOD BY AUTOMATED COUNT: 14.5 % (ref 11.7–13)
GFR SERPL CREATININE-BSD FRML MDRD: 35 ML/MIN/1.73
HCT VFR BLD AUTO: 37.4 % (ref 35.6–45.5)
HGB BLD-MCNC: 11 G/DL (ref 11.9–15.5)
IMM GRANULOCYTES # BLD: 0.09 10*3/MM3 (ref 0–0.03)
IMM GRANULOCYTES NFR BLD: 2.1 % (ref 0–0.5)
LYMPHOCYTES # BLD AUTO: 1.07 10*3/MM3 (ref 0.9–4.8)
LYMPHOCYTES NFR BLD AUTO: 24.8 % (ref 19.6–45.3)
MCH RBC QN AUTO: 28.6 PG (ref 26.9–32)
MCHC RBC AUTO-ENTMCNC: 29.4 G/DL (ref 32.4–36.3)
MCV RBC AUTO: 97.4 FL (ref 80.5–98.2)
MONOCYTES # BLD AUTO: 0.81 10*3/MM3 (ref 0.2–1.2)
MONOCYTES NFR BLD AUTO: 18.8 % (ref 5–12)
NEUTROPHILS # BLD AUTO: 2.11 10*3/MM3 (ref 1.9–8.1)
NEUTROPHILS NFR BLD AUTO: 49 % (ref 42.7–76)
PLATELET # BLD AUTO: 133 10*3/MM3 (ref 140–500)
PLATELET # BLD AUTO: 133 10*3/MM3 (ref 140–500)
PMV BLD AUTO: 11.3 FL (ref 6–12)
RBC # BLD AUTO: 3.84 10*6/MM3 (ref 3.9–5.2)
VANCOMYCIN SERPL-MCNC: 14.9 MCG/ML (ref 5–40)
WBC NRBC COR # BLD: 4.31 10*3/MM3 (ref 4.5–10.7)

## 2017-04-01 PROCEDURE — 85025 COMPLETE CBC W/AUTO DIFF WBC: CPT | Performed by: INTERNAL MEDICINE

## 2017-04-01 PROCEDURE — 94799 UNLISTED PULMONARY SVC/PX: CPT

## 2017-04-01 PROCEDURE — 85049 AUTOMATED PLATELET COUNT: CPT | Performed by: INTERNAL MEDICINE

## 2017-04-01 PROCEDURE — 82565 ASSAY OF CREATININE: CPT | Performed by: INTERNAL MEDICINE

## 2017-04-01 PROCEDURE — 94660 CPAP INITIATION&MGMT: CPT

## 2017-04-01 PROCEDURE — 25010000002 VANCOMYCIN: Performed by: INTERNAL MEDICINE

## 2017-04-01 PROCEDURE — 80202 ASSAY OF VANCOMYCIN: CPT | Performed by: INTERNAL MEDICINE

## 2017-04-01 PROCEDURE — 25010000002 ENOXAPARIN PER 10 MG: Performed by: INTERNAL MEDICINE

## 2017-04-01 RX ADMIN — ACETAMINOPHEN 650 MG: 325 TABLET ORAL at 10:37

## 2017-04-01 RX ADMIN — IPRATROPIUM BROMIDE AND ALBUTEROL SULFATE 3 ML: .5; 3 SOLUTION RESPIRATORY (INHALATION) at 15:17

## 2017-04-01 RX ADMIN — ENOXAPARIN SODIUM 40 MG: 40 INJECTION SUBCUTANEOUS at 08:26

## 2017-04-01 RX ADMIN — PANTOPRAZOLE SODIUM 40 MG: 40 TABLET, DELAYED RELEASE ORAL at 08:25

## 2017-04-01 RX ADMIN — DOCUSATE SODIUM 100 MG: 100 CAPSULE, LIQUID FILLED ORAL at 17:35

## 2017-04-01 RX ADMIN — ENOXAPARIN SODIUM 40 MG: 40 INJECTION SUBCUTANEOUS at 20:44

## 2017-04-01 RX ADMIN — LEVOTHYROXINE SODIUM 50 MCG: 50 TABLET ORAL at 08:25

## 2017-04-01 RX ADMIN — DIVALPROEX SODIUM 125 MG: 125 CAPSULE, COATED PELLETS ORAL at 08:25

## 2017-04-01 RX ADMIN — BUMETANIDE 1 MG: 1 TABLET ORAL at 08:25

## 2017-04-01 RX ADMIN — AMLODIPINE BESYLATE 5 MG: 5 TABLET ORAL at 08:25

## 2017-04-01 RX ADMIN — IPRATROPIUM BROMIDE AND ALBUTEROL SULFATE 3 ML: .5; 3 SOLUTION RESPIRATORY (INHALATION) at 23:38

## 2017-04-01 RX ADMIN — ROPINIROLE HYDROCHLORIDE 0.25 MG: 0.25 TABLET, FILM COATED ORAL at 08:26

## 2017-04-01 RX ADMIN — ROPINIROLE HYDROCHLORIDE 0.25 MG: 0.25 TABLET, FILM COATED ORAL at 17:34

## 2017-04-01 RX ADMIN — DULOXETINE HYDROCHLORIDE 30 MG: 30 CAPSULE, DELAYED RELEASE ORAL at 08:25

## 2017-04-01 RX ADMIN — IPRATROPIUM BROMIDE AND ALBUTEROL SULFATE 3 ML: .5; 3 SOLUTION RESPIRATORY (INHALATION) at 20:24

## 2017-04-01 RX ADMIN — IPRATROPIUM BROMIDE AND ALBUTEROL SULFATE 3 ML: .5; 3 SOLUTION RESPIRATORY (INHALATION) at 04:04

## 2017-04-01 RX ADMIN — VANCOMYCIN HYDROCHLORIDE 2000 MG: 1 INJECTION, POWDER, LYOPHILIZED, FOR SOLUTION INTRAVENOUS at 20:43

## 2017-04-01 RX ADMIN — IPRATROPIUM BROMIDE AND ALBUTEROL SULFATE 3 ML: .5; 3 SOLUTION RESPIRATORY (INHALATION) at 12:06

## 2017-04-01 RX ADMIN — IPRATROPIUM BROMIDE AND ALBUTEROL SULFATE 3 ML: .5; 3 SOLUTION RESPIRATORY (INHALATION) at 07:16

## 2017-04-01 RX ADMIN — DOCUSATE SODIUM 100 MG: 100 CAPSULE, LIQUID FILLED ORAL at 08:25

## 2017-04-01 RX ADMIN — ROPINIROLE HYDROCHLORIDE 0.25 MG: 0.25 TABLET, FILM COATED ORAL at 20:43

## 2017-04-01 RX ADMIN — VANCOMYCIN HYDROCHLORIDE 2000 MG: 1 INJECTION, POWDER, LYOPHILIZED, FOR SOLUTION INTRAVENOUS at 10:34

## 2017-04-01 NOTE — PLAN OF CARE
Problem: Patient Care Overview (Adult)  Goal: Plan of Care Review  Outcome: Ongoing (interventions implemented as appropriate)    04/01/17 8936   Coping/Psychosocial Response Interventions   Plan Of Care Reviewed With patient   Patient Care Overview   Progress improving   Outcome Evaluation   Outcome Summary/Follow up Plan waiting on placement. having trouble finding due to trilogy machine.

## 2017-04-01 NOTE — PROGRESS NOTES
LOS: 6 days   Patient Care Team:  Rubi Adkins MD as PCP - General (General Practice)    Chief Complaint:    Chief Complaint   Patient presents with   • Shortness of Breath       Subjective    Tolerating NIV  No fevers since 0729  procal on 3/27 negative  States she is doing well and working on diet.    Objective     Vital Signs  Temp:  [98.3 °F (36.8 °C)-98.8 °F (37.1 °C)] 98.8 °F (37.1 °C)  Heart Rate:  [85-94] 94  Resp:  [18-20] 20  BP: ()/(47-69) 119/49     Ventilator/Non-Invasive Ventilation Settings     Start     Ordered    03/26/17 1619  . BIPAP; IPAP: 18; EPAP: 8; Breath Rate: 18; Titrate for spO2: 88% - 92%, between  Until Discontinued     Question Answer Comment   . BIPAP    IPAP 18    EPAP 8    Breath Rate 18    Titrate for spO2 88% - 92%    Titrate for spO2 between        03/26/17 1619    03/26/17 1129  . BIPAP; IPAP: 12; EPAP: 8; Tidal Volume: 500; Titrate for spO2: 88% - 92%  Until Discontinued     Question Answer Comment   . BIPAP    IPAP 12    EPAP 8    Tidal Volume 500    Titrate for spO2 88% - 92%        03/26/17 1128                       Body mass index is 67.63 kg/(m^2).  I/O last 3 completed shifts:  In: 740 [P.O.:740]  Out: -        Physical Exam:  General Appearance: Super obese white female resting in bed in no apparent distress  Eyes: Conjunctiva are clear and anicteric  ENT: Mallampati 4 airway oral mucous members  dry nasal septum midline  Neck: No adenopathy trachea midline no visible jugular venous distention but obesity limits exam  Lungs: Clear no wheezes or rales don't hear a lot of air movement she is not labored  Cardiac: Give her rate and rhythm no murmur  Abdomen: Severely obese  : Not examined  Musc/Skel: She has significant lower extremity edema at least 1+ he does have some erythema and increased warmth on the pretibial region of the left lower extremity, none on the right  Skin: chronic venous stasis changes bilateral pretibial area  Neuro: Awake alert  cooperative she is very weak  Extremities/P Vascular: Palpable radial and dorsalis pedis pulses  MSE: feels well       Labs:    Results from last 7 days  Lab Units 04/01/17  0540 03/27/17  0541 03/26/17  1159   WBC 10*3/mm3 4.31* 9.12 11.29*   HEMOGLOBIN g/dL 11.0* 10.5* 11.2*   PLATELETS 10*3/mm3 133*  133* 192 203     Results from last 7 days  Lab Units 04/01/17  0540 03/31/17  0846 03/29/17  1354 03/28/17  0536 03/27/17  0541 03/26/17  1159   SODIUM mmol/L  --  142  --  146* 149* 149*   POTASSIUM mmol/L  --  3.8 3.4* 3.3* 4.1 4.7   CHLORIDE mmol/L  --  93*  --  91* 98 102   TOTAL CO2 mmol/L  --  36.8*  --  41.3* 41.7* 40.7*   BUN mg/dL  --  18  --  27* 31* 28*   CREATININE mg/dL 1.47* 1.55*  --  1.20* 1.18* 1.36*   GLUCOSE mg/dL  --  128*  --  98 96 118*   CALCIUM mg/dL  --  8.9  --  9.1 8.8 8.9   MAGNESIUM mg/dL  --   --   --  1.8  --   --    Estimated Creatinine Clearance: 56.8 mL/min (by C-G formula based on Cr of 1.47).        amLODIPine 5 mg Oral Daily   bumetanide 1 mg Oral Daily   Divalproex Sodium 125 mg Oral Daily   docusate sodium 100 mg Oral BID   DULoxetine 30 mg Oral Daily   enoxaparin 40 mg Subcutaneous Q12H   ipratropium-albuterol 3 mL Nebulization Q4H - RT   levoFLOXacin 750 mg Intravenous Q48H   levothyroxine 50 mcg Oral Q AM   pantoprazole 40 mg Oral Q AM   rOPINIRole 0.25 mg Oral TID   vancomycin 2,000 mg Intravenous Q12H       Pharmacy to dose vancomycin        Diagnostics:  Imaging Results (last 24 hours)     Procedure Component Value Units Date/Time    XR Chest 1 View [[id number]] Collected:  03/27/17 0449     Updated:  03/27/17 0541    Narrative:       X-RAY CHEST 1 VIEW.     HISTORY: Follow-up CHF.     COMPARISON: 03/26/2017.     FINDINGS:  Cardiomegaly and low lung volumes.         Severe pulmonary congestion remains unchanged.              Impression:       Persistent pulmonary edema, overall no significant change.         This report was finalized on 3/27/2017 5:38 AM by Dr. Carmichael  MD Nany.             Reviewed chest x-ray still suggestive of pulmonary congestion and edema      Assessment/Plan     Patient Active Problem List   Diagnosis Code   • Acute respiratory failure with hypercapnia J96.02     Acute now resolved  Chronic Hypercapnic and hypoxic resp failure  COPD  Fever - Cellulitis  Super Obesity  RAI    -likely copd, obesity hypoventilation syndrome  Suspect airam  - continue abx  Recheck procal  Would send out on doxycyline for staph coverage of cellulitis  Weight loss  Monitor cr  Continue bipap with sleep or naps  Trilogy when as above when available  Back to NH when trilogy available    DVT prophylaxis she is very high risk so I will put her on high risk protocol      Plan for disposition: Return to nursing home when trilogy can be arranged if fever workup complete amount probably be a couple days wouldn't have to wait for cultures    Thierry Myers MD  04/01/17  1:44 PM

## 2017-04-01 NOTE — PLAN OF CARE
Problem: Patient Care Overview (Adult)  Goal: Plan of Care Review  Outcome: Ongoing (interventions implemented as appropriate)    04/01/17 0422   Coping/Psychosocial Response Interventions   Plan Of Care Reviewed With patient   Patient Care Overview   Progress improving       Goal: Adult Individualization and Mutuality  Outcome: Ongoing (interventions implemented as appropriate)  Goal: Discharge Needs Assessment  Outcome: Ongoing (interventions implemented as appropriate)    Problem: Respiratory Insufficiency (Adult)  Goal: Acid/Base Balance  Outcome: Ongoing (interventions implemented as appropriate)  Goal: Effective Ventilation  Outcome: Ongoing (interventions implemented as appropriate)    Problem: Fall Risk (Adult)  Goal: Absence of Falls  Outcome: Ongoing (interventions implemented as appropriate)    Problem: Pressure Ulcer (Adult)  Goal: Signs and Symptoms of Listed Potential Problems Will be Absent or Manageable (Pressure Ulcer)  Outcome: Ongoing (interventions implemented as appropriate)

## 2017-04-01 NOTE — PROGRESS NOTES
"Pharmacokinetic Consult - Vancomycin Dosing (Follow-up Note)    Maryjane Amaral is on day 2 pharmacy to dose vancomycin for SSTI (cellulitis) per consult of Dr. Brock.  Goal trough: 12-20 mg/L     Relevant clinical data and objective history reviewed:  72 y.o. female 64\" (162.6 cm) (!) 394 lb (179 kg)   Body mass index is 67.63 kg/(m^2).    Past Medical History:   Diagnosis Date   • Anemia    • Anxiety    • CHF (congestive heart failure)    • COPD (chronic obstructive pulmonary disease)    • Coronary artery disease    • Depression    • Diabetes mellitus    • GERD (gastroesophageal reflux disease)    • Hyperlipidemia    • Hypertension    • Hypothyroidism    • Insomnia    • Kidney failure    • Morbid obesity    • Osteoarthritis    • Pneumonia    • Polyneuropathy    • Respiratory failure    • Sepsis    • Weakness      Creatinine   Date Value Ref Range Status   03/31/2017 1.55 (H) 0.57 - 1.00 mg/dL Final   03/28/2017 1.20 (H) 0.57 - 1.00 mg/dL Final   03/27/2017 1.18 (H) 0.57 - 1.00 mg/dL Final     BUN   Date Value Ref Range Status   03/31/2017 18 8 - 23 mg/dL Final     Estimated Creatinine Clearance: 53.9 mL/min (by C-G formula based on Cr of 1.55).    Lab Results   Component Value Date    WBC 4.31 (L) 04/01/2017     Temp Readings from Last 3 Encounters:   04/01/17 98.3 °F (36.8 °C) (Oral)     Baseline culture/source/susceptibility:  Blood= NG < 24 hours    IV Anti-Infectives     Ordered     Dose/Rate Route Frequency Start Stop    04/01/17 0846  vancomycin 2000 mg/500 mL 0.9% NS IVPB ([facility])     Ordering Provider:  Krishna Brock MD    2,000 mg Intravenous Every 12 Hours 04/01/17 0930      03/31/17 1421  vancomycin 2500 mg/500 mL 0.9% NS IVPB ([facility])     Ordering Provider:  Krishna Brock MD    2,500 mg Intravenous Once 03/31/17 1500 03/31/17 1628    03/31/17 1312  levoFLOXacin (LEVAQUIN) 750 mg/150 mL D5W (premix) (LEVAQUIN) 750 mg     Ordering Provider:  Krishna Brock MD    750 mg Intravenous Every 48 " Hours 03/31/17 1345      03/31/17 1311  Pharmacy to dose vancomycin     Ordering Provider:  Krishna Brock MD     Does not apply Continuous PRN 03/31/17 1311           Lab Results   Component Value Date    VANCORANDOM 14.90 04/01/2017     Assessment/Plan  Patient received vancomycin 2500mg IV x 1 dose at 1628 yesterday. Random level this AM= 14.9mcg/ml.  Will start vancomycin 2gm (10mg/kg) IV q12h. Trough ordered prior to evening dose tomorrow.  SCr ordered for today, will check results and adjust schedule vancomycin regimen if needed.   Pharmacy will continue to follow daily while on vancomycin and adjust as needed.     Joanie Agrawal, PharmD  4/1/2017  8:52 AM

## 2017-04-02 LAB
ALBUMIN SERPL-MCNC: 3.4 G/DL (ref 3.5–5.2)
ANION GAP SERPL CALCULATED.3IONS-SCNC: 13 MMOL/L
BACTERIA SPEC AEROBE CULT: NO GROWTH
BASOPHILS # BLD AUTO: 0.01 10*3/MM3 (ref 0–0.2)
BASOPHILS # BLD AUTO: 0.01 10*3/MM3 (ref 0–0.2)
BASOPHILS NFR BLD AUTO: 0.2 % (ref 0–1.5)
BASOPHILS NFR BLD AUTO: 0.2 % (ref 0–1.5)
BUN BLD-MCNC: 15 MG/DL (ref 8–23)
BUN/CREAT SERPL: 10 (ref 7–25)
CALCIUM SPEC-SCNC: 9 MG/DL (ref 8.6–10.5)
CHLORIDE SERPL-SCNC: 92 MMOL/L (ref 98–107)
CO2 SERPL-SCNC: 37 MMOL/L (ref 22–29)
CREAT BLD-MCNC: 1.5 MG/DL (ref 0.57–1)
DEPRECATED RDW RBC AUTO: 50.6 FL (ref 37–54)
DEPRECATED RDW RBC AUTO: 51.2 FL (ref 37–54)
EOSINOPHIL # BLD AUTO: 0.24 10*3/MM3 (ref 0–0.7)
EOSINOPHIL # BLD AUTO: 0.25 10*3/MM3 (ref 0–0.7)
EOSINOPHIL NFR BLD AUTO: 4.8 % (ref 0.3–6.2)
EOSINOPHIL NFR BLD AUTO: 5.2 % (ref 0.3–6.2)
ERYTHROCYTE [DISTWIDTH] IN BLOOD BY AUTOMATED COUNT: 14.3 % (ref 11.7–13)
ERYTHROCYTE [DISTWIDTH] IN BLOOD BY AUTOMATED COUNT: 14.5 % (ref 11.7–13)
GFR SERPL CREATININE-BSD FRML MDRD: 34 ML/MIN/1.73
GLUCOSE BLD-MCNC: 96 MG/DL (ref 65–99)
GLUCOSE BLDC GLUCOMTR-MCNC: 111 MG/DL (ref 70–130)
HCT VFR BLD AUTO: 34.9 % (ref 35.6–45.5)
HCT VFR BLD AUTO: 36.9 % (ref 35.6–45.5)
HGB BLD-MCNC: 10.3 G/DL (ref 11.9–15.5)
HGB BLD-MCNC: 10.8 G/DL (ref 11.9–15.5)
IMM GRANULOCYTES # BLD: 0.08 10*3/MM3 (ref 0–0.03)
IMM GRANULOCYTES # BLD: 0.09 10*3/MM3 (ref 0–0.03)
IMM GRANULOCYTES NFR BLD: 1.6 % (ref 0–0.5)
IMM GRANULOCYTES NFR BLD: 1.9 % (ref 0–0.5)
LYMPHOCYTES # BLD AUTO: 1.24 10*3/MM3 (ref 0.9–4.8)
LYMPHOCYTES # BLD AUTO: 1.35 10*3/MM3 (ref 0.9–4.8)
LYMPHOCYTES NFR BLD AUTO: 25 % (ref 19.6–45.3)
LYMPHOCYTES NFR BLD AUTO: 27.8 % (ref 19.6–45.3)
MCH RBC QN AUTO: 28.4 PG (ref 26.9–32)
MCH RBC QN AUTO: 28.5 PG (ref 26.9–32)
MCHC RBC AUTO-ENTMCNC: 29.3 G/DL (ref 32.4–36.3)
MCHC RBC AUTO-ENTMCNC: 29.5 G/DL (ref 32.4–36.3)
MCV RBC AUTO: 96.4 FL (ref 80.5–98.2)
MCV RBC AUTO: 97.1 FL (ref 80.5–98.2)
MONOCYTES # BLD AUTO: 0.74 10*3/MM3 (ref 0.2–1.2)
MONOCYTES # BLD AUTO: 0.77 10*3/MM3 (ref 0.2–1.2)
MONOCYTES NFR BLD AUTO: 15.3 % (ref 5–12)
MONOCYTES NFR BLD AUTO: 15.5 % (ref 5–12)
NEUTROPHILS # BLD AUTO: 2.41 10*3/MM3 (ref 1.9–8.1)
NEUTROPHILS # BLD AUTO: 2.62 10*3/MM3 (ref 1.9–8.1)
NEUTROPHILS NFR BLD AUTO: 49.6 % (ref 42.7–76)
NEUTROPHILS NFR BLD AUTO: 52.9 % (ref 42.7–76)
NRBC BLD MANUAL-RTO: 0 /100 WBC (ref 0–0)
PHOSPHATE SERPL-MCNC: 3.2 MG/DL (ref 2.5–4.5)
PLATELET # BLD AUTO: 138 10*3/MM3 (ref 140–500)
PLATELET # BLD AUTO: 142 10*3/MM3 (ref 140–500)
PMV BLD AUTO: 10.6 FL (ref 6–12)
PMV BLD AUTO: 10.9 FL (ref 6–12)
POTASSIUM BLD-SCNC: 3.5 MMOL/L (ref 3.5–5.2)
RBC # BLD AUTO: 3.62 10*6/MM3 (ref 3.9–5.2)
RBC # BLD AUTO: 3.8 10*6/MM3 (ref 3.9–5.2)
SODIUM BLD-SCNC: 142 MMOL/L (ref 136–145)
VANCOMYCIN TROUGH SERPL-MCNC: 30.9 MCG/ML (ref 5–20)
WBC NRBC COR # BLD: 4.85 10*3/MM3 (ref 4.5–10.7)
WBC NRBC COR # BLD: 4.96 10*3/MM3 (ref 4.5–10.7)

## 2017-04-02 PROCEDURE — 25010000002 ENOXAPARIN PER 10 MG: Performed by: INTERNAL MEDICINE

## 2017-04-02 PROCEDURE — 94799 UNLISTED PULMONARY SVC/PX: CPT

## 2017-04-02 PROCEDURE — 25010000002 LEVOFLOXACIN PER 250 MG: Performed by: INTERNAL MEDICINE

## 2017-04-02 PROCEDURE — 82962 GLUCOSE BLOOD TEST: CPT

## 2017-04-02 PROCEDURE — 85025 COMPLETE CBC W/AUTO DIFF WBC: CPT | Performed by: INTERNAL MEDICINE

## 2017-04-02 PROCEDURE — 94660 CPAP INITIATION&MGMT: CPT

## 2017-04-02 PROCEDURE — 25010000002 VANCOMYCIN: Performed by: INTERNAL MEDICINE

## 2017-04-02 PROCEDURE — 80202 ASSAY OF VANCOMYCIN: CPT | Performed by: INTERNAL MEDICINE

## 2017-04-02 PROCEDURE — 80069 RENAL FUNCTION PANEL: CPT | Performed by: INTERNAL MEDICINE

## 2017-04-02 RX ORDER — LEVOFLOXACIN 5 MG/ML
750 INJECTION, SOLUTION INTRAVENOUS EVERY 24 HOURS
Status: DISCONTINUED | OUTPATIENT
Start: 2017-04-03 | End: 2017-04-05

## 2017-04-02 RX ADMIN — LEVOFLOXACIN 750 MG: 5 INJECTION, SOLUTION INTRAVENOUS at 15:12

## 2017-04-02 RX ADMIN — VANCOMYCIN HYDROCHLORIDE 2000 MG: 1 INJECTION, POWDER, LYOPHILIZED, FOR SOLUTION INTRAVENOUS at 11:04

## 2017-04-02 RX ADMIN — IPRATROPIUM BROMIDE AND ALBUTEROL SULFATE 3 ML: .5; 3 SOLUTION RESPIRATORY (INHALATION) at 15:06

## 2017-04-02 RX ADMIN — ROPINIROLE HYDROCHLORIDE 0.25 MG: 0.25 TABLET, FILM COATED ORAL at 08:25

## 2017-04-02 RX ADMIN — ROPINIROLE HYDROCHLORIDE 0.25 MG: 0.25 TABLET, FILM COATED ORAL at 21:07

## 2017-04-02 RX ADMIN — DOCUSATE SODIUM 100 MG: 100 CAPSULE, LIQUID FILLED ORAL at 08:25

## 2017-04-02 RX ADMIN — IPRATROPIUM BROMIDE AND ALBUTEROL SULFATE 3 ML: .5; 3 SOLUTION RESPIRATORY (INHALATION) at 11:13

## 2017-04-02 RX ADMIN — IPRATROPIUM BROMIDE AND ALBUTEROL SULFATE 3 ML: .5; 3 SOLUTION RESPIRATORY (INHALATION) at 23:23

## 2017-04-02 RX ADMIN — LEVOTHYROXINE SODIUM 50 MCG: 50 TABLET ORAL at 08:25

## 2017-04-02 RX ADMIN — ACETAMINOPHEN 650 MG: 325 TABLET ORAL at 00:53

## 2017-04-02 RX ADMIN — PANTOPRAZOLE SODIUM 40 MG: 40 TABLET, DELAYED RELEASE ORAL at 05:19

## 2017-04-02 RX ADMIN — AMLODIPINE BESYLATE 5 MG: 5 TABLET ORAL at 08:25

## 2017-04-02 RX ADMIN — IPRATROPIUM BROMIDE AND ALBUTEROL SULFATE 3 ML: .5; 3 SOLUTION RESPIRATORY (INHALATION) at 07:06

## 2017-04-02 RX ADMIN — BUMETANIDE 1 MG: 1 TABLET ORAL at 08:25

## 2017-04-02 RX ADMIN — DIVALPROEX SODIUM 125 MG: 125 CAPSULE, COATED PELLETS ORAL at 08:25

## 2017-04-02 RX ADMIN — DOCUSATE SODIUM 100 MG: 100 CAPSULE, LIQUID FILLED ORAL at 17:24

## 2017-04-02 RX ADMIN — IPRATROPIUM BROMIDE AND ALBUTEROL SULFATE 3 ML: .5; 3 SOLUTION RESPIRATORY (INHALATION) at 03:00

## 2017-04-02 RX ADMIN — IPRATROPIUM BROMIDE AND ALBUTEROL SULFATE 3 ML: .5; 3 SOLUTION RESPIRATORY (INHALATION) at 19:02

## 2017-04-02 RX ADMIN — DULOXETINE HYDROCHLORIDE 30 MG: 30 CAPSULE, DELAYED RELEASE ORAL at 08:25

## 2017-04-02 RX ADMIN — ROPINIROLE HYDROCHLORIDE 0.25 MG: 0.25 TABLET, FILM COATED ORAL at 17:24

## 2017-04-02 RX ADMIN — ENOXAPARIN SODIUM 40 MG: 40 INJECTION SUBCUTANEOUS at 08:26

## 2017-04-02 NOTE — PLAN OF CARE
Problem: Patient Care Overview (Adult)  Goal: Plan of Care Review  Outcome: Ongoing (interventions implemented as appropriate)    04/02/17 6950   Coping/Psychosocial Response Interventions   Plan Of Care Reviewed With patient   Patient Care Overview   Progress no change   Outcome Evaluation   Outcome Summary/Follow up Plan ct scan ordered to evaluate cellulitis vs hematoma on abdomen and leg. continue to monitor. lovenox discontinued for now. poss d/c tuesday depending on results of CT. waiting for NH to get trilogy machine for patient.

## 2017-04-02 NOTE — PROGRESS NOTES
LOS: 7 days   Patient Care Team:  Rubi Adkins MD as PCP - General (General Practice)    Chief Complaint:    Chief Complaint   Patient presents with   • Shortness of Breath       Subjective    Says not feeling as good as yesterday.  Complains of pain in her poster right thigh and posterior left knee  Objective     Vital Signs  Temp:  [97.9 °F (36.6 °C)-98.7 °F (37.1 °C)] 98.4 °F (36.9 °C)  Heart Rate:  [82-99] 99  Resp:  [16-20] 20  BP: (105-120)/(46-57) 114/46     Ventilator/Non-Invasive Ventilation Settings     Start     Ordered    03/26/17 1619  . BIPAP; IPAP: 18; EPAP: 8; Breath Rate: 18; Titrate for spO2: 88% - 92%, between  Until Discontinued     Question Answer Comment   . BIPAP    IPAP 18    EPAP 8    Breath Rate 18    Titrate for spO2 88% - 92%    Titrate for spO2 between        03/26/17 1619    03/26/17 1129  . BIPAP; IPAP: 12; EPAP: 8; Tidal Volume: 500; Titrate for spO2: 88% - 92%  Until Discontinued     Question Answer Comment   . BIPAP    IPAP 12    EPAP 8    Tidal Volume 500    Titrate for spO2 88% - 92%        03/26/17 1128                       Body mass index is 70.03 kg/(m^2).  I/O last 3 completed shifts:  In: 360 [P.O.:360]  Out: -   I/O this shift:  In: 580 [P.O.:580]  Out: 100 [Urine:100]    Physical Exam:  General Appearance: Super obese white female resting in bed in no apparent distress  Eyes: Conjunctiva are clear and anicteric  ENT: Mallampati 4 airway oral mucous members  dry nasal septum midline  Neck: No adenopathy trachea midline no visible jugular venous distention but obesity limits exam  Lungs: distant breath sounds but no wheeze  Cardiac: distant heart sounds but sounds regular  Abdomen: Severely obese sof non tender  : Not examined  Musc/Skel: Sposter right thigh with area of hardening and warmth, palpable mass almost below skin, exam limited severely by body habitus  Skin: chronic venous stasis changes bilateral pretibial area  Neuro: Awake alert cooperative she is  very weak  Extremities/P Vascular: Palpable radial and dorsalis pedis pulses  MSE: feels well       Labs:    Results from last 7 days  Lab Units 04/02/17  0644 04/01/17  0540 03/27/17  0541   WBC 10*3/mm3 4.85 4.31* 9.12   HEMOGLOBIN g/dL 10.8* 11.0* 10.5*   PLATELETS 10*3/mm3 142 133*  133* 192       Results from last 7 days  Lab Units 04/02/17  0644 04/01/17  0540 03/31/17  0846 03/29/17  1354 03/28/17  0536 03/27/17  0541   SODIUM mmol/L 142  --  142  --  146* 149*   POTASSIUM mmol/L 3.5  --  3.8 3.4* 3.3* 4.1   CHLORIDE mmol/L 92*  --  93*  --  91* 98   TOTAL CO2 mmol/L 37.0*  --  36.8*  --  41.3* 41.7*   BUN mg/dL 15  --  18  --  27* 31*   CREATININE mg/dL 1.50* 1.47* 1.55*  --  1.20* 1.18*   GLUCOSE mg/dL 96  --  128*  --  98 96   CALCIUM mg/dL 9.0  --  8.9  --  9.1 8.8   MAGNESIUM mg/dL  --   --   --   --  1.8  --    PHOSPHORUS mg/dL 3.2  --   --   --   --   --    Estimated Creatinine Clearance: 57.3 mL/min (by C-G formula based on Cr of 1.5).        amLODIPine 5 mg Oral Daily   bumetanide 1 mg Oral Daily   Divalproex Sodium 125 mg Oral Daily   docusate sodium 100 mg Oral BID   DULoxetine 30 mg Oral Daily   enoxaparin 40 mg Subcutaneous Q12H   ipratropium-albuterol 3 mL Nebulization Q4H - RT   [START ON 4/3/2017] levoFLOXacin 750 mg Intravenous Q24H   levothyroxine 50 mcg Oral Q AM   pantoprazole 40 mg Oral Q AM   rOPINIRole 0.25 mg Oral TID   vancomycin 2,000 mg Intravenous Q12H       Pharmacy to dose vancomycin        Diagnostics:  Imaging Results (last 24 hours)     Procedure Component Value Units Date/Time    XR Chest 1 View [[id number]] Collected:  03/27/17 0449     Updated:  03/27/17 0541    Narrative:       X-RAY CHEST 1 VIEW.     HISTORY: Follow-up CHF.     COMPARISON: 03/26/2017.     FINDINGS:  Cardiomegaly and low lung volumes.         Severe pulmonary congestion remains unchanged.              Impression:       Persistent pulmonary edema, overall no significant change.         This report was  finalized on 3/27/2017 5:38 AM by Dr. Jany Ayon MD.             Reviewed chest x-ray still suggestive of pulmonary congestion and edema      Assessment/Plan     Patient Active Problem List   Diagnosis Code   • Acute respiratory failure with hypercapnia J96.02     Acute now resolved  Chronic Hypercapnic and hypoxic resp failure  COPD  Cellulitis vs hematoma  Super Obesity  RAI    -likely copd, obesity hypoventilation syndrome  Suspect airam  - continue abx  Recheck procal  Would send out on doxycyline for staph coverage of cellulitis  Weight loss  Monitor cr  Continue bipap with sleep or naps  Trilogy when as above when available  Back to NH when trilogy available    Concerned that areas thought to be cellulitis may represent abscess versus hematoma.  Will have to send for le ct ast best way to evaluate.  Resend cbc  Hold lovenox        Plan for disposition: Return to nursing home when trilogy can be arranged if fever workup complete amount probably be a couple days wouldn't have to wait for cultures    Thierry Myers MD  04/02/17  4:14 PM

## 2017-04-02 NOTE — PLAN OF CARE
Problem: Patient Care Overview (Adult)  Goal: Plan of Care Review  Outcome: Ongoing (interventions implemented as appropriate)    04/02/17 0023 04/02/17 0125   Coping/Psychosocial Response Interventions   Plan Of Care Reviewed With patient --    Patient Care Overview   Progress --  no change   Outcome Evaluation   Outcome Summary/Follow up Plan --  VSS. Safety maintained. Continue to monitor.        Goal: Adult Individualization and Mutuality  Outcome: Ongoing (interventions implemented as appropriate)  Goal: Discharge Needs Assessment  Outcome: Ongoing (interventions implemented as appropriate)    Problem: Respiratory Insufficiency (Adult)  Goal: Acid/Base Balance  Outcome: Ongoing (interventions implemented as appropriate)  Goal: Effective Ventilation  Outcome: Ongoing (interventions implemented as appropriate)    Problem: Fall Risk (Adult)  Goal: Absence of Falls  Outcome: Ongoing (interventions implemented as appropriate)    Problem: Pressure Ulcer (Adult)  Goal: Signs and Symptoms of Listed Potential Problems Will be Absent or Manageable (Pressure Ulcer)  Outcome: Ongoing (interventions implemented as appropriate)

## 2017-04-03 ENCOUNTER — APPOINTMENT (OUTPATIENT)
Dept: CT IMAGING | Facility: HOSPITAL | Age: 72
End: 2017-04-03
Attending: INTERNAL MEDICINE

## 2017-04-03 LAB
ALBUMIN SERPL-MCNC: 3.5 G/DL (ref 3.5–5.2)
ANION GAP SERPL CALCULATED.3IONS-SCNC: 11.8 MMOL/L
BASOPHILS # BLD AUTO: 0.01 10*3/MM3 (ref 0–0.2)
BASOPHILS NFR BLD AUTO: 0.2 % (ref 0–1.5)
BUN BLD-MCNC: 12 MG/DL (ref 8–23)
BUN/CREAT SERPL: 7.6 (ref 7–25)
CALCIUM SPEC-SCNC: 9.2 MG/DL (ref 8.6–10.5)
CHLORIDE SERPL-SCNC: 93 MMOL/L (ref 98–107)
CO2 SERPL-SCNC: 36.2 MMOL/L (ref 22–29)
CREAT BLD-MCNC: 1.57 MG/DL (ref 0.57–1)
DEPRECATED RDW RBC AUTO: 51 FL (ref 37–54)
EOSINOPHIL # BLD AUTO: 0.29 10*3/MM3 (ref 0–0.7)
EOSINOPHIL NFR BLD AUTO: 5.5 % (ref 0.3–6.2)
ERYTHROCYTE [DISTWIDTH] IN BLOOD BY AUTOMATED COUNT: 14.3 % (ref 11.7–13)
GFR SERPL CREATININE-BSD FRML MDRD: 32 ML/MIN/1.73
GLUCOSE BLD-MCNC: 107 MG/DL (ref 65–99)
HCT VFR BLD AUTO: 36.3 % (ref 35.6–45.5)
HGB BLD-MCNC: 10.6 G/DL (ref 11.9–15.5)
IMM GRANULOCYTES # BLD: 0.11 10*3/MM3 (ref 0–0.03)
IMM GRANULOCYTES NFR BLD: 2.1 % (ref 0–0.5)
LYMPHOCYTES # BLD AUTO: 1.49 10*3/MM3 (ref 0.9–4.8)
LYMPHOCYTES NFR BLD AUTO: 28.1 % (ref 19.6–45.3)
MCH RBC QN AUTO: 28.4 PG (ref 26.9–32)
MCHC RBC AUTO-ENTMCNC: 29.2 G/DL (ref 32.4–36.3)
MCV RBC AUTO: 97.3 FL (ref 80.5–98.2)
MONOCYTES # BLD AUTO: 0.78 10*3/MM3 (ref 0.2–1.2)
MONOCYTES NFR BLD AUTO: 14.7 % (ref 5–12)
NEUTROPHILS # BLD AUTO: 2.62 10*3/MM3 (ref 1.9–8.1)
NEUTROPHILS NFR BLD AUTO: 49.4 % (ref 42.7–76)
PHOSPHATE SERPL-MCNC: 3.2 MG/DL (ref 2.5–4.5)
PLATELET # BLD AUTO: 158 10*3/MM3 (ref 140–500)
PMV BLD AUTO: 10.9 FL (ref 6–12)
POTASSIUM BLD-SCNC: 3.6 MMOL/L (ref 3.5–5.2)
RBC # BLD AUTO: 3.73 10*6/MM3 (ref 3.9–5.2)
SODIUM BLD-SCNC: 141 MMOL/L (ref 136–145)
VANCOMYCIN SERPL-MCNC: 25.6 MCG/ML (ref 5–40)
WBC NRBC COR # BLD: 5.3 10*3/MM3 (ref 4.5–10.7)

## 2017-04-03 PROCEDURE — 94799 UNLISTED PULMONARY SVC/PX: CPT

## 2017-04-03 PROCEDURE — 80202 ASSAY OF VANCOMYCIN: CPT | Performed by: INTERNAL MEDICINE

## 2017-04-03 PROCEDURE — 80069 RENAL FUNCTION PANEL: CPT | Performed by: INTERNAL MEDICINE

## 2017-04-03 PROCEDURE — 73700 CT LOWER EXTREMITY W/O DYE: CPT

## 2017-04-03 PROCEDURE — 85025 COMPLETE CBC W/AUTO DIFF WBC: CPT | Performed by: INTERNAL MEDICINE

## 2017-04-03 PROCEDURE — 25010000002 LEVOFLOXACIN PER 250 MG: Performed by: INTERNAL MEDICINE

## 2017-04-03 PROCEDURE — 25010000002 VANCOMYCIN: Performed by: INTERNAL MEDICINE

## 2017-04-03 RX ADMIN — LEVOFLOXACIN 750 MG: 5 INJECTION, SOLUTION INTRAVENOUS at 15:53

## 2017-04-03 RX ADMIN — LEVOTHYROXINE SODIUM 50 MCG: 50 TABLET ORAL at 05:53

## 2017-04-03 RX ADMIN — BUMETANIDE 1 MG: 1 TABLET ORAL at 08:29

## 2017-04-03 RX ADMIN — ROPINIROLE HYDROCHLORIDE 0.25 MG: 0.25 TABLET, FILM COATED ORAL at 15:53

## 2017-04-03 RX ADMIN — IPRATROPIUM BROMIDE AND ALBUTEROL SULFATE 3 ML: .5; 3 SOLUTION RESPIRATORY (INHALATION) at 19:17

## 2017-04-03 RX ADMIN — IPRATROPIUM BROMIDE AND ALBUTEROL SULFATE 3 ML: .5; 3 SOLUTION RESPIRATORY (INHALATION) at 23:28

## 2017-04-03 RX ADMIN — VANCOMYCIN HYDROCHLORIDE 2000 MG: 1 INJECTION, POWDER, LYOPHILIZED, FOR SOLUTION INTRAVENOUS at 18:56

## 2017-04-03 RX ADMIN — IPRATROPIUM BROMIDE AND ALBUTEROL SULFATE 3 ML: .5; 3 SOLUTION RESPIRATORY (INHALATION) at 16:37

## 2017-04-03 RX ADMIN — ROPINIROLE HYDROCHLORIDE 0.25 MG: 0.25 TABLET, FILM COATED ORAL at 08:29

## 2017-04-03 RX ADMIN — IPRATROPIUM BROMIDE AND ALBUTEROL SULFATE 3 ML: .5; 3 SOLUTION RESPIRATORY (INHALATION) at 12:47

## 2017-04-03 RX ADMIN — DULOXETINE HYDROCHLORIDE 30 MG: 30 CAPSULE, DELAYED RELEASE ORAL at 08:29

## 2017-04-03 RX ADMIN — DOCUSATE SODIUM 100 MG: 100 CAPSULE, LIQUID FILLED ORAL at 18:56

## 2017-04-03 RX ADMIN — ACETAMINOPHEN 650 MG: 325 TABLET ORAL at 08:30

## 2017-04-03 RX ADMIN — DIVALPROEX SODIUM 125 MG: 125 CAPSULE, COATED PELLETS ORAL at 08:29

## 2017-04-03 RX ADMIN — IPRATROPIUM BROMIDE AND ALBUTEROL SULFATE 3 ML: .5; 3 SOLUTION RESPIRATORY (INHALATION) at 08:29

## 2017-04-03 RX ADMIN — ACETAMINOPHEN 650 MG: 325 TABLET ORAL at 22:57

## 2017-04-03 RX ADMIN — PANTOPRAZOLE SODIUM 40 MG: 40 TABLET, DELAYED RELEASE ORAL at 05:53

## 2017-04-03 RX ADMIN — ROPINIROLE HYDROCHLORIDE 0.25 MG: 0.25 TABLET, FILM COATED ORAL at 21:19

## 2017-04-03 RX ADMIN — AMLODIPINE BESYLATE 5 MG: 5 TABLET ORAL at 08:29

## 2017-04-03 RX ADMIN — DOCUSATE SODIUM 100 MG: 100 CAPSULE, LIQUID FILLED ORAL at 08:28

## 2017-04-03 NOTE — PROGRESS NOTES
Pharmacy to dose vancomycin per Dr. Brock’s request  71yo    185 kg   crcl = 54.7 ml/min    DX: LE Cellulitis…….  Levaquin 750mg q 24h  & vancomycin  3/31 BC= no growth      UC= no growth    Concomitant problem:  chronic hypoxemic and hypercapnia resp. failure, possible CHF  Need at least 24 hrs abx to evaluate for infection.    Day 4… (pt was on 2g q12 hr… trough 4/2 @ 21:12  =30.9 mcg/ml)     Dose held last night, random level this am 4/3 @ 04:36 = 25.6 mcg/ml  (dropped from 30 to 25 in 7 hours)    Action:  Wait another 14 hours, then give vancomycin 2gm q 24 hours.                Although patient will NOT be at steady-state.. I will order a trough                 For tomorrow evening due to unpredictable results in this obese                 Pt with fluctuating renal function.    Scr = 1.18   1.2   1.55   1.47   1.5   1.57   .. will need to monitor daily  BUN= 31  27  18  15   12    72 y.o.(!) 408 lb (185 kg)Estimated Creatinine Clearance: 54.7 mL/min (by C-G formula based on Cr of 1.57).  Blood Culture   Date Value Ref Range Status   03/31/2017 No growth at 2 days  Preliminary     Urine Culture   Date Value Ref Range Status   03/31/2017 No growth  Final       Results from last 7 days  Lab Units 04/03/17  0436 04/02/17  1743 04/02/17  0644   WBC 10*3/mm3 5.30 4.96 4.85       Results from last 7 days  Lab Units 04/03/17  0436 04/02/17  0644 04/01/17  0540   CREATININE mg/dL 1.57* 1.50* 1.47*     Estimated Creatinine Clearance: 54.7 mL/min (by C-G formula based on Cr of 1.57).  Temp Readings from Last 3 Encounters:   04/03/17 98.2 °F (36.8 °C) (Oral)   06/09/16 100.4 °F (38 °C) (Tympanic)        Lab Results   Component Value Date    VANCOTROUGH 30.90 (C) 04/02/2017     Lab Results   Component Value Date    VANCORANDOM 25.60 04/03/2017     José Miguel Paige, Pharm.D., RP, BCPS

## 2017-04-03 NOTE — PROGRESS NOTES
"Pharmacokinetic Consult - Vancomycin Dosing (Follow-up Note)    Maryjane Amaral is on day 3 pharmacy to dose vancomycin for LE cellulitis per Dr. Brock's request. Goal trough: 10-20 mg/L.    Relevant clinical data and objective history reviewed:  72 y.o. female 64\" (162.6 cm) (!) 408 lb (185 kg)  Nursing home patient who presents to the hospital in acute hypoxemic and hypercapnic respiratory failure; likely COPD exacerbation. Pretibial region of left leg was red, warm, and tender. Vancomycin for Staph coverage.    Creatinine   Date Value Ref Range Status   04/02/2017 1.50 (H) 0.57 - 1.00 mg/dL Final   04/01/2017 1.47 (H) 0.57 - 1.00 mg/dL Final   03/31/2017 1.55 (H) 0.57 - 1.00 mg/dL Final     BUN   Date Value Ref Range Status   04/02/2017 15 8 - 23 mg/dL Final     Estimated Creatinine Clearance: 57.3 mL/min (by C-G formula based on Cr of 1.5).    Lab Results   Component Value Date    WBC 4.96 04/02/2017     Temp Readings from Last 3 Encounters:   04/02/17 98.4 °F (36.9 °C) (Oral)     Baseline culture/source/susceptibility:   3/31: Blood culture x 2-NGTD  3/31: Urine culture-no growth (final)    IV Anti-Infectives     Ordered     Dose/Rate Route Frequency Start Stop    04/02/17 2215  vancomycin 2000 mg/500 mL 0.9% NS IVPB ([facility])     Ordering Provider:  Krishna Brock MD    2,000 mg  over 210 Minutes Intravenous Every 24 Hours 04/03/17 1800      04/02/17 1613  levoFLOXacin (LEVAQUIN) 750 mg/150 mL D5W (premix) (LEVAQUIN) 750 mg     Ordering Provider:  Thierry Myers MD    750 mg Intravenous Every 24 Hours 04/03/17 1512        Lab Results   Component Value Date    VANCOTROUGH 30.90 (C) 04/02/2017     Assessment/Plan    1. Patient was on a vancomycin dose of 2000 mg (10 mg/kg) IV q12h. Trough was elevated at 30.9 (~10 hr level)-probably closer to 29. Will hold dose for tonight (called nurse), and ordered a random vancomycin level for tomorrow morning.     2. Put in a dose of 2000 mg (~10 mg/kg) IV q24h to " start at 1800. Depending on random vancomycin level tomorrow morning, this dose may be changed by clinical pharmacist.    3. Will monitor serum creatinine at least every 48 hours per dosing recommendations. SCr was 1.5 today.     4. Pharmacy will continue to follow daily while on vancomycin and adjust as needed.     Glory Montoya, Pharm.D., BCPS

## 2017-04-03 NOTE — PROGRESS NOTES
"         LOS: 8 days   Patient Care Team:  Rubi Adkins MD as PCP - General (General Practice)    Chief Complaint:    Chief Complaint   Patient presents with   • Shortness of Breath       Subjective    \"feeling good today\"  Less pain in legs    Objective     Vital Signs  Temp:  [98.2 °F (36.8 °C)-98.4 °F (36.9 °C)] 98.4 °F (36.9 °C)  Heart Rate:  [87-99] 90  Resp:  [16-24] 24  BP: (109-123)/(46-82) 117/62     Ventilator/Non-Invasive Ventilation Settings     Start     Ordered    03/26/17 1619  . BIPAP; IPAP: 18; EPAP: 8; Breath Rate: 18; Titrate for spO2: 88% - 92%, between  Until Discontinued     Question Answer Comment   . BIPAP    IPAP 18    EPAP 8    Breath Rate 18    Titrate for spO2 88% - 92%    Titrate for spO2 between        03/26/17 1619    03/26/17 1129  . BIPAP; IPAP: 12; EPAP: 8; Tidal Volume: 500; Titrate for spO2: 88% - 92%  Until Discontinued     Question Answer Comment   . BIPAP    IPAP 12    EPAP 8    Tidal Volume 500    Titrate for spO2 88% - 92%        03/26/17 1128                       Body mass index is 70.03 kg/(m^2).  I/O last 3 completed shifts:  In: 1540 [P.O.:1540]  Out: 250 [Urine:250]  I/O this shift:  In: 720 [P.O.:720]  Out: -     Physical Exam:  General Appearance: Super obese white female resting in bed in no apparent distress  Eyes: Conjunctiva are clear and anicteric  ENT: Mallampati 4 airway oral mucous members  dry nasal septum midline  Neck: No adenopathy trachea midline no visible jugular venous distention but obesity limits exam  Lungs: distant breath sounds but no wheeze  Cardiac: distant heart sounds but sounds regular  Abdomen: Severely obese sof non tender  : Not examined  Musc/Skel: posterior right thigh with area of hardening and warmth but much less warmth, no crepitus, exam limited severely by body habitus  Skin: chronic venous stasis changes bilateral pretibial area  Neuro: Awake alert cooperative she is very weak  Extremities/P Vascular: Palpable radial and " dorsalis pedis pulses  MSE: feels well       Labs:    Results from last 7 days  Lab Units 04/03/17  0436 04/02/17  1743 04/02/17  0644 04/01/17  0540   WBC 10*3/mm3 5.30 4.96 4.85 4.31*   HEMOGLOBIN g/dL 10.6* 10.3* 10.8* 11.0*   PLATELETS 10*3/mm3 158 138* 142 133*  133*       Results from last 7 days  Lab Units 04/03/17  0436 04/02/17  0644 04/01/17  0540 03/31/17  0846 03/29/17  1354 03/28/17  0536   SODIUM mmol/L 141 142  --  142  --  146*   POTASSIUM mmol/L 3.6 3.5  --  3.8 3.4* 3.3*   CHLORIDE mmol/L 93* 92*  --  93*  --  91*   TOTAL CO2 mmol/L 36.2* 37.0*  --  36.8*  --  41.3*   BUN mg/dL 12 15  --  18  --  27*   CREATININE mg/dL 1.57* 1.50* 1.47* 1.55*  --  1.20*   GLUCOSE mg/dL 107* 96  --  128*  --  98   CALCIUM mg/dL 9.2 9.0  --  8.9  --  9.1   MAGNESIUM mg/dL  --   --   --   --   --  1.8   PHOSPHORUS mg/dL 3.2 3.2  --   --   --   --    Estimated Creatinine Clearance: 54.7 mL/min (by C-G formula based on Cr of 1.57).        amLODIPine 5 mg Oral Daily   bumetanide 1 mg Oral Daily   Divalproex Sodium 125 mg Oral Daily   docusate sodium 100 mg Oral BID   DULoxetine 30 mg Oral Daily   ipratropium-albuterol 3 mL Nebulization Q4H - RT   levoFLOXacin 750 mg Intravenous Q24H   levothyroxine 50 mcg Oral Q AM   pantoprazole 40 mg Oral Q AM   rOPINIRole 0.25 mg Oral TID   vancomycin 2,000 mg Intravenous Q24H       Pharmacy to dose vancomycin        Diagnostics:  Imaging Results (last 24 hours)     Procedure Component Value Units Date/Time    XR Chest 1 View [[id number]] Collected:  03/27/17 0449     Updated:  03/27/17 0541    Narrative:       X-RAY CHEST 1 VIEW.     HISTORY: Follow-up CHF.     COMPARISON: 03/26/2017.     FINDINGS:  Cardiomegaly and low lung volumes.         Severe pulmonary congestion remains unchanged.              Impression:       Persistent pulmonary edema, overall no significant change.         This report was finalized on 3/27/2017 5:38 AM by Dr. Jany Ayon MD.             Reviewed chest  x-ray still suggestive of pulmonary congestion and edema      Assessment/Plan     Patient Active Problem List   Diagnosis Code   • Acute respiratory failure with hypercapnia J96.02     Acute now resolved  Chronic Hypercapnic and hypoxic resp failure  COPD  Cellulitis vs hematoma  Super Obesity  RAI    -likely copd, obesity hypoventilation syndrome  Suspect airam  - continue abx  Recheck procal  Would send out on doxycyline for staph coverage of cellulitis  Weight loss  Monitor cr  Continue bipap with sleep or naps  Trilogy when as above when available  Back to NH when trilogy available    Concerned that areas thought to be cellulitis may represent abscess versus hematoma.  Will have to send for le ct ast best way to evaluate.  Cbc stable  Ct done report pending  Area looks better today.  IF ct read not concerning will send tomorrow if trilogy is available.        Plan for disposition: Return to nursing home when trilogy can be arranged if fever workup complete amount probably be a couple days wouldn't have to wait for cultures    Thierry Myers MD  04/03/17  1:26 PM

## 2017-04-03 NOTE — PLAN OF CARE
Problem: Patient Care Overview (Adult)  Goal: Plan of Care Review  Outcome: Ongoing (interventions implemented as appropriate)    04/02/17 2059 04/03/17 0510   Coping/Psychosocial Response Interventions   Plan Of Care Reviewed With patient --    Patient Care Overview   Progress --  no change   Outcome Evaluation   Outcome Summary/Follow up Plan --  CT scan still needs to be completed to evaluate red spots on BLE (cellulitis vs hematoma). Lovenox has been D/C'd for now. Monitor vanc level-last trough was elevated and last night's dose held. VSS. Skin care to coccyx and L breast fold. BiPA P at night-tolerating well. Waiting for nrusing home to get trilogy machine for pt.       Goal: Adult Individualization and Mutuality  Outcome: Ongoing (interventions implemented as appropriate)    Problem: Respiratory Insufficiency (Adult)  Goal: Acid/Base Balance  Outcome: Ongoing (interventions implemented as appropriate)    04/03/17 0510   Respiratory Insufficiency (Adult)   Acid/Base Balance making progress toward outcome       Goal: Effective Ventilation  Outcome: Ongoing (interventions implemented as appropriate)    04/03/17 0510   Respiratory Insufficiency (Adult)   Effective Ventilation making progress toward outcome         Problem: Fall Risk (Adult)  Goal: Absence of Falls  Outcome: Ongoing (interventions implemented as appropriate)    04/03/17 0510   Fall Risk (Adult)   Absence of Falls achieves outcome  (SAFETY MAINTAINED)         Problem: Pressure Ulcer (Adult)  Goal: Signs and Symptoms of Listed Potential Problems Will be Absent or Manageable (Pressure Ulcer)  Outcome: Ongoing (interventions implemented as appropriate)    04/03/17 0510   Pressure Ulcer   Problems Assessed (Pressure Ulcer) all   Problems Present (Pressure Ulcer) none

## 2017-04-03 NOTE — PROGRESS NOTES
Continued Stay Note  [facility]     Patient Name: Maryjane Amaral  MRN: [id number]  Today's Date: 4/3/2017    Admit Date: 3/26/2017          Discharge Plan       04/03/17 1352    Case Management/Social Work Plan    Plan [facility]    Additional Comments Spoke with DON at [facility], Layla, who states the vendor used for Trilogy vents will need to order the Trilogy one day in advance of discharge. They need the following additional documentation to order it: EPAP Minimum/Maximum, Pressure Support Min/Max, and that it will be used when sleeping or hours of required use. Sticky note left for Dr. Brock to complete this. CCP to follow up with order and fax to facility to order the Trilogy. DOUG Eugene CCP              Discharge Codes     None            Shy Eugene RN

## 2017-04-03 NOTE — PLAN OF CARE
Problem: Patient Care Overview (Adult)  Goal: Plan of Care Review  Outcome: Ongoing (interventions implemented as appropriate)    04/03/17 1946   Coping/Psychosocial Response Interventions   Plan Of Care Reviewed With patient   Patient Care Overview   Progress improving   Outcome Evaluation   Outcome Summary/Follow up Plan cont to monitor, antibiotics given as ordered       Goal: Adult Individualization and Mutuality  Outcome: Ongoing (interventions implemented as appropriate)    Problem: Respiratory Insufficiency (Adult)  Goal: Acid/Base Balance  Outcome: Ongoing (interventions implemented as appropriate)  Goal: Effective Ventilation  Outcome: Ongoing (interventions implemented as appropriate)    Problem: Fall Risk (Adult)  Goal: Absence of Falls  Outcome: Ongoing (interventions implemented as appropriate)    Problem: Pressure Ulcer (Adult)  Goal: Signs and Symptoms of Listed Potential Problems Will be Absent or Manageable (Pressure Ulcer)  Outcome: Ongoing (interventions implemented as appropriate)

## 2017-04-04 LAB
ALBUMIN SERPL-MCNC: 3.4 G/DL (ref 3.5–5.2)
ANION GAP SERPL CALCULATED.3IONS-SCNC: 12.4 MMOL/L
BASOPHILS # BLD AUTO: 0.02 10*3/MM3 (ref 0–0.2)
BASOPHILS NFR BLD AUTO: 0.4 % (ref 0–1.5)
BUN BLD-MCNC: 11 MG/DL (ref 8–23)
BUN/CREAT SERPL: 7.9 (ref 7–25)
CALCIUM SPEC-SCNC: 9 MG/DL (ref 8.6–10.5)
CHLORIDE SERPL-SCNC: 94 MMOL/L (ref 98–107)
CO2 SERPL-SCNC: 36.6 MMOL/L (ref 22–29)
CREAT BLD-MCNC: 1.4 MG/DL (ref 0.57–1)
DEPRECATED RDW RBC AUTO: 51.6 FL (ref 37–54)
EOSINOPHIL # BLD AUTO: 0.33 10*3/MM3 (ref 0–0.7)
EOSINOPHIL NFR BLD AUTO: 5.8 % (ref 0.3–6.2)
ERYTHROCYTE [DISTWIDTH] IN BLOOD BY AUTOMATED COUNT: 14.4 % (ref 11.7–13)
GFR SERPL CREATININE-BSD FRML MDRD: 37 ML/MIN/1.73
GLUCOSE BLD-MCNC: 105 MG/DL (ref 65–99)
HCT VFR BLD AUTO: 35.3 % (ref 35.6–45.5)
HGB BLD-MCNC: 10.3 G/DL (ref 11.9–15.5)
IMM GRANULOCYTES # BLD: 0.19 10*3/MM3 (ref 0–0.03)
IMM GRANULOCYTES NFR BLD: 3.4 % (ref 0–0.5)
LYMPHOCYTES # BLD AUTO: 1.48 10*3/MM3 (ref 0.9–4.8)
LYMPHOCYTES NFR BLD AUTO: 26.2 % (ref 19.6–45.3)
MCH RBC QN AUTO: 28.5 PG (ref 26.9–32)
MCHC RBC AUTO-ENTMCNC: 29.2 G/DL (ref 32.4–36.3)
MCV RBC AUTO: 97.8 FL (ref 80.5–98.2)
MONOCYTES # BLD AUTO: 0.85 10*3/MM3 (ref 0.2–1.2)
MONOCYTES NFR BLD AUTO: 15 % (ref 5–12)
NEUTROPHILS # BLD AUTO: 2.78 10*3/MM3 (ref 1.9–8.1)
NEUTROPHILS NFR BLD AUTO: 49.2 % (ref 42.7–76)
PHOSPHATE SERPL-MCNC: 4.1 MG/DL (ref 2.5–4.5)
PLATELET # BLD AUTO: 176 10*3/MM3 (ref 140–500)
PMV BLD AUTO: 11.3 FL (ref 6–12)
POTASSIUM BLD-SCNC: 3.5 MMOL/L (ref 3.5–5.2)
RBC # BLD AUTO: 3.61 10*6/MM3 (ref 3.9–5.2)
SODIUM BLD-SCNC: 143 MMOL/L (ref 136–145)
VANCOMYCIN TROUGH SERPL-MCNC: 19 MCG/ML (ref 5–20)
WBC NRBC COR # BLD: 5.65 10*3/MM3 (ref 4.5–10.7)

## 2017-04-04 PROCEDURE — 25010000002 LEVOFLOXACIN PER 250 MG: Performed by: INTERNAL MEDICINE

## 2017-04-04 PROCEDURE — 94799 UNLISTED PULMONARY SVC/PX: CPT

## 2017-04-04 PROCEDURE — 80069 RENAL FUNCTION PANEL: CPT | Performed by: INTERNAL MEDICINE

## 2017-04-04 PROCEDURE — 25010000002 VANCOMYCIN: Performed by: INTERNAL MEDICINE

## 2017-04-04 PROCEDURE — 85025 COMPLETE CBC W/AUTO DIFF WBC: CPT | Performed by: INTERNAL MEDICINE

## 2017-04-04 PROCEDURE — 80202 ASSAY OF VANCOMYCIN: CPT | Performed by: INTERNAL MEDICINE

## 2017-04-04 RX ADMIN — LEVOTHYROXINE SODIUM 50 MCG: 50 TABLET ORAL at 07:36

## 2017-04-04 RX ADMIN — ROPINIROLE HYDROCHLORIDE 0.25 MG: 0.25 TABLET, FILM COATED ORAL at 21:06

## 2017-04-04 RX ADMIN — AMLODIPINE BESYLATE 5 MG: 5 TABLET ORAL at 10:29

## 2017-04-04 RX ADMIN — DOCUSATE SODIUM 100 MG: 100 CAPSULE, LIQUID FILLED ORAL at 18:28

## 2017-04-04 RX ADMIN — PANTOPRAZOLE SODIUM 40 MG: 40 TABLET, DELAYED RELEASE ORAL at 07:36

## 2017-04-04 RX ADMIN — BUMETANIDE 1 MG: 1 TABLET ORAL at 10:28

## 2017-04-04 RX ADMIN — DIVALPROEX SODIUM 125 MG: 125 CAPSULE, COATED PELLETS ORAL at 10:29

## 2017-04-04 RX ADMIN — IPRATROPIUM BROMIDE AND ALBUTEROL SULFATE 3 ML: .5; 3 SOLUTION RESPIRATORY (INHALATION) at 19:53

## 2017-04-04 RX ADMIN — LEVOFLOXACIN 750 MG: 5 INJECTION, SOLUTION INTRAVENOUS at 14:42

## 2017-04-04 RX ADMIN — ROPINIROLE HYDROCHLORIDE 0.25 MG: 0.25 TABLET, FILM COATED ORAL at 18:28

## 2017-04-04 RX ADMIN — ROPINIROLE HYDROCHLORIDE 0.25 MG: 0.25 TABLET, FILM COATED ORAL at 10:29

## 2017-04-04 RX ADMIN — VANCOMYCIN HYDROCHLORIDE 2000 MG: 1 INJECTION, POWDER, LYOPHILIZED, FOR SOLUTION INTRAVENOUS at 18:28

## 2017-04-04 RX ADMIN — IPRATROPIUM BROMIDE AND ALBUTEROL SULFATE 3 ML: .5; 3 SOLUTION RESPIRATORY (INHALATION) at 16:36

## 2017-04-04 RX ADMIN — DOCUSATE SODIUM 100 MG: 100 CAPSULE, LIQUID FILLED ORAL at 10:33

## 2017-04-04 RX ADMIN — IPRATROPIUM BROMIDE AND ALBUTEROL SULFATE 3 ML: .5; 3 SOLUTION RESPIRATORY (INHALATION) at 12:15

## 2017-04-04 RX ADMIN — DULOXETINE HYDROCHLORIDE 30 MG: 30 CAPSULE, DELAYED RELEASE ORAL at 10:29

## 2017-04-04 RX ADMIN — IPRATROPIUM BROMIDE AND ALBUTEROL SULFATE 3 ML: .5; 3 SOLUTION RESPIRATORY (INHALATION) at 08:31

## 2017-04-04 NOTE — PROGRESS NOTES
LOS: 9 days   Patient Care Team:  Rubi Adkins MD as PCP - General (General Practice)    Chief Complaint:    Chief Complaint   Patient presents with   • Shortness of Breath       Subjective    Legs feel good.  Breathing without difficulty..  Some cough today.    Objective     Vital Signs  Temp:  [98.3 °F (36.8 °C)-98.5 °F (36.9 °C)] 98.3 °F (36.8 °C)  Heart Rate:  [81-92] 86  Resp:  [20-24] 20  BP: (116-145)/(62-89) 123/70     Ventilator/Non-Invasive Ventilation Settings     Start     Ordered    03/26/17 1619  . BIPAP; IPAP: 18; EPAP: 8; Breath Rate: 18; Titrate for spO2: 88% - 92%, between  Until Discontinued     Question Answer Comment   . BIPAP    IPAP 18    EPAP 8    Breath Rate 18    Titrate for spO2 88% - 92%    Titrate for spO2 between        03/26/17 1619    03/26/17 1129  . BIPAP; IPAP: 12; EPAP: 8; Tidal Volume: 500; Titrate for spO2: 88% - 92%  Until Discontinued     Question Answer Comment   . BIPAP    IPAP 12    EPAP 8    Tidal Volume 500    Titrate for spO2 88% - 92%        03/26/17 1128                       Body mass index is 86.82 kg/(m^2).  I/O last 3 completed shifts:  In: 1320 [P.O.:1320]  Out: -   I/O this shift:  In: 720 [P.O.:720]  Out: -     Physical Exam:  General Appearance: Super obese white female resting in bed in no apparent distress  Eyes: Conjunctiva are clear and anicteric  ENT: Mallampati 4 airway oral mucous members  dry nasal septum midline  Neck: No adenopathy trachea midline no visible jugular venous distention but obesity limits exam  Lungs: distant breath sounds but no wheeze  Cardiac: distant heart sounds but sounds regular  Abdomen: Severely obese sof non tender  : Not examined  Musc/Skel: posterior right thigh with area soft today and non warm, erythema decreased, no crepitus, exam limited severely by body habitus  Skin: chronic venous stasis changes bilateral pretibial area  Neuro: Awake alert cooperative she is very weak  Extremities/P Vascular: Palpable  radial and dorsalis pedis pulses  MSE: feels well       Labs:    Results from last 7 days  Lab Units 04/04/17  0424 04/03/17  0436 04/02/17  1743 04/02/17  0644 04/01/17  0540   WBC 10*3/mm3 5.65 5.30 4.96 4.85 4.31*   HEMOGLOBIN g/dL 10.3* 10.6* 10.3* 10.8* 11.0*   PLATELETS 10*3/mm3 176 158 138* 142 133*  133*       Results from last 7 days  Lab Units 04/04/17  0424 04/03/17  0436 04/02/17  0644 04/01/17  0540 03/31/17  0846 03/29/17  1354   SODIUM mmol/L 143 141 142  --  142  --    POTASSIUM mmol/L 3.5 3.6 3.5  --  3.8 3.4*   CHLORIDE mmol/L 94* 93* 92*  --  93*  --    TOTAL CO2 mmol/L 36.6* 36.2* 37.0*  --  36.8*  --    BUN mg/dL 11 12 15  --  18  --    CREATININE mg/dL 1.40* 1.57* 1.50* 1.47* 1.55*  --    GLUCOSE mg/dL 105* 107* 96  --  128*  --    CALCIUM mg/dL 9.0 9.2 9.0  --  8.9  --    PHOSPHORUS mg/dL 4.1 3.2 3.2  --   --   --    Estimated Creatinine Clearance: 71.1 mL/min (by C-G formula based on Cr of 1.4).        amLODIPine 5 mg Oral Daily   bumetanide 1 mg Oral Daily   Divalproex Sodium 125 mg Oral Daily   docusate sodium 100 mg Oral BID   DULoxetine 30 mg Oral Daily   ipratropium-albuterol 3 mL Nebulization Q4H - RT   levoFLOXacin 750 mg Intravenous Q24H   levothyroxine 50 mcg Oral Q AM   pantoprazole 40 mg Oral Q AM   rOPINIRole 0.25 mg Oral TID   vancomycin 2,000 mg Intravenous Q24H       Pharmacy to dose vancomycin        Diagnostics:  Imaging Results (last 24 hours)     Procedure Component Value Units Date/Time    XR Chest 1 View [[id number]] Collected:  03/27/17 0449     Updated:  03/27/17 0541    Narrative:       X-RAY CHEST 1 VIEW.     HISTORY: Follow-up CHF.     COMPARISON: 03/26/2017.     FINDINGS:  Cardiomegaly and low lung volumes.         Severe pulmonary congestion remains unchanged.              Impression:       Persistent pulmonary edema, overall no significant change.         This report was finalized on 3/27/2017 5:38 AM by Dr. Jany Ayon MD.             Reviewed chest x-ray still  suggestive of pulmonary congestion and edema      Assessment/Plan     Patient Active Problem List   Diagnosis Code   • Acute respiratory failure with hypercapnia J96.02     Acute now resolved  Chronic Hypercapnic and hypoxic resp failure  COPD  Cellulitis vs hematoma  Super Obesity  RAI    -likely copd, obesity hypoventilation syndrome  Suspect airam  - continue abx  Recheck procal  Would send out on doxycyline for staph coverage of cellulitis  Weight loss  Monitor cr  Continue bipap with sleep or naps  Trilogy when as above when available  Back to NH when trilogy available    Would transition to doxy 100mg po bid upon discharge  Awaiting trilogy      Plan for disposition: Return to nursing home when trilogy can be arranged if fever workup complete amount probably be a couple days wouldn't have to wait for cultures    Thierry Myers MD  04/04/17  12:59 PM

## 2017-04-04 NOTE — PLAN OF CARE
Problem: Patient Care Overview (Adult)  Goal: Plan of Care Review  Outcome: Ongoing (interventions implemented as appropriate)    04/03/17 2115 04/04/17 0325   Coping/Psychosocial Response Interventions   Plan Of Care Reviewed With patient --    Patient Care Overview   Progress --  improving   Outcome Evaluation   Outcome Summary/Follow up Plan --  CT of BLE did not show evidence of hematoma. Pt reports the red areas on her BLE and L abdomen are less sore today. Pt continues on IV Vanc and Levaquin as ordered. Skin care to pressure ulcer-barrier cream. Pt possibly to be D/C'd back to NH today if Trilogy machine can be obtained at NH.        Goal: Adult Individualization and Mutuality  Outcome: Ongoing (interventions implemented as appropriate)    Problem: Respiratory Insufficiency (Adult)  Goal: Acid/Base Balance  Outcome: Ongoing (interventions implemented as appropriate)    04/04/17 0325   Respiratory Insufficiency (Adult)   Acid/Base Balance making progress toward outcome       Goal: Effective Ventilation  Outcome: Ongoing (interventions implemented as appropriate)    04/04/17 0325   Respiratory Insufficiency (Adult)   Effective Ventilation making progress toward outcome  (3L O2 DURING DAY, CPAP AT NIGHT)         Problem: Fall Risk (Adult)  Goal: Absence of Falls  Outcome: Ongoing (interventions implemented as appropriate)    04/04/17 0325   Fall Risk (Adult)   Absence of Falls achieves outcome  (SAFETY MAINTAINED)         Problem: Pressure Ulcer (Adult)  Goal: Signs and Symptoms of Listed Potential Problems Will be Absent or Manageable (Pressure Ulcer)  Outcome: Ongoing (interventions implemented as appropriate)    04/04/17 0325   Pressure Ulcer   Problems Assessed (Pressure Ulcer) all   Problems Present (Pressure Ulcer) none

## 2017-04-04 NOTE — PROGRESS NOTES
"Pharmacy to Dose Vancomycin IV    Day 10  (started on 03/26, D/C'd on 3/27, restarted on 03/31)  Consult for Dr. Brock  Treating: SSTI/Recurrent BLE Cellulitis  Goal trough: 15-20 mcg/mL    Current Vancomycin dose: 9 mg/Kg/day    IV Anti-Infectives     Ordered     Dose/Rate Route Frequency Start Stop    04/02/17 2215  vancomycin 2000 mg/500 mL 0.9% NS IVPB ([facility])     Ordering Provider:  Krishna Brock MD    2,000 mg  over 210 Minutes Intravenous Every 24 Hours 04/03/17 1800      04/02/17 1613  levoFLOXacin (LEVAQUIN) 750 mg/150 mL D5W (premix) (LEVAQUIN) 750 mg     Ordering Provider:  Thierry Myers MD    750 mg Intravenous Every 24 Hours 04/03/17 1512      03/31/17 1421  vancomycin 2500 mg/500 mL 0.9% NS IVPB ([facility])     Ordering Provider:  Krishna Brock MD    2,500 mg Intravenous Once 03/31/17 1500 03/31/17 1628    03/31/17 1311  Pharmacy to dose vancomycin     Ordering Provider:  Krishna Brock MD     Does not apply Continuous PRN 03/31/17 1311      03/26/17 1705  vancomycin 2500 mg/500 mL 0.9% NS IVPB ([facility])     Ordering Provider:  Krishna Brock MD    2,500 mg  over 250 Minutes Intravenous Once 03/26/17 1745 03/26/17 2344      Relevant clinical data and objective history reviewed:  72 y.o. female 64\" (162.6 cm) (!) 505 lb 12.8 oz (229 kg)  Body mass index is 86.82 kg/(m^2).    Results from last 7 days  Lab Units 04/04/17  0424 04/03/17  0436 04/02/17  0644   CREATININE mg/dL 1.40* 1.57* 1.50*     Estimated Creatinine Clearance: 71.1 mL/min (by C-G formula based on Cr of 1.4).    Lab Results   Component Value Date    WBC 5.65 04/04/2017    WBC 5.30 04/03/2017    WBC 4.96 04/02/2017     Temp:  [98.3 °F (36.8 °C)-98.5 °F (36.9 °C)] 98.5 °F (36.9 °C)  Heart Rate:  [81-92] 84  Resp:  [20] 20  BP: (116-145)/(57-89) 134/67    Intake/Output Summary (Last 24 hours) at 04/04/17 7255  Last data filed at 04/04/17 1735   Gross per 24 hour   Intake             1080 ml   Output                0 ml "   Net             1080 ml     Baseline cultures/labs/radiology:  3/26 RVP: None detected  3/26 CXR: moderate cardiomegaly and mild vascular congestion and probable mild interstitial pulmonary edema. A tiny left pleural effusion is suspected. No obvious focal areas of consolidation are identified.  3/26 PCT: 0.05  3/27 PCT: 0.05  3/27 CXR: Persistent pulm edema  3/31 Blood cx 2/2: NG x 4 days  3/31 Urine cx: NGF  4/03 CT LLE: Muscular deconditioning in both legs and some nonspecific subcutaneous edema. However, no abscess, hematoma, or mass is present. Advanced osteoarthritic change is incidentally noted at both  Knees.  4/03 CT RLE: Muscular deconditioning in both legs and some nonspecific subcutaneous edema. However, no abscess, hematoma, or mass is present. Advanced osteoarthritic change is incidentally noted at both  knees.     Assessment/Plan:   PMH: Anemia; Anxiety; CHF; COPD; Coronary artery disease; Depression; Diabetes mellitus; GERD; Hyperlipidemia; Hypertension; Hypothyroidism; Insomnia; Kidney failure; Morbid obesity; Osteoarthritis; Pneumonia; Polyneuropathy; Respiratory failure; Sepsis; and Weakness.     Vancomycin level therapeutic but, not at steady-state- expect further accumulation. Will slightly decrease Vancomycin dose to 1750 mg IV q24hrs & follow with levels    Lab Results   Component Value Date    VANCO TROUGH 19.00 mcg/mL (prior to 2nd dose of Vanc 2gm IV q24hrs) NOT STEADY-STATE 04/04/2017     PK parameters:  Tye: 0.058 hr-1,  T1/2:  11.9 hr, Vd 155.9 L  (0.7 L/kg)    Vancomycin Dosing History:  3/26 19:34 Vancomycin 2500 mg IV x1  3/27 Vancomycin IV D/C'd   3/27 05:41 Vanc random: 18.10 mcg/mL  3/31 16:28 Vancomycin 2500 mg IV x1   4/01 05:40 Vanc random: 14.90 mcg/mL  4/01 10:34, 20:43 Vancomycin 2000 mg IV q12hrs  4/02 11:04 Vancomycin 2000 mg IV    4/02 21:12 Vanc trough: 30.90 mcg/mL   4/03 04:36 Vanc random: 25.60 mcg/mL  4/03 18:56 Vancomycin 2000 mg IV q24hrs   4/04 17:07 Vanc trough:  19.0 mcg/mL  4/04 18:28 Vancomycin 2000 mg IV    Thank you for your consult,    Layla Mireles [facility]  04/04/17 7:13 PM

## 2017-04-05 LAB
ALBUMIN SERPL-MCNC: 3.7 G/DL (ref 3.5–5.2)
ANION GAP SERPL CALCULATED.3IONS-SCNC: 15 MMOL/L
BACTERIA SPEC AEROBE CULT: NORMAL
BACTERIA SPEC AEROBE CULT: NORMAL
BASOPHILS # BLD AUTO: 0.02 10*3/MM3 (ref 0–0.2)
BASOPHILS NFR BLD AUTO: 0.3 % (ref 0–1.5)
BUN BLD-MCNC: 12 MG/DL (ref 8–23)
BUN/CREAT SERPL: 7.2 (ref 7–25)
CALCIUM SPEC-SCNC: 8.9 MG/DL (ref 8.6–10.5)
CHLORIDE SERPL-SCNC: 96 MMOL/L (ref 98–107)
CO2 SERPL-SCNC: 34 MMOL/L (ref 22–29)
CREAT BLD-MCNC: 1.66 MG/DL (ref 0.57–1)
DEPRECATED RDW RBC AUTO: 51.2 FL (ref 37–54)
EOSINOPHIL # BLD AUTO: 0.3 10*3/MM3 (ref 0–0.7)
EOSINOPHIL NFR BLD AUTO: 4.5 % (ref 0.3–6.2)
ERYTHROCYTE [DISTWIDTH] IN BLOOD BY AUTOMATED COUNT: 14.2 % (ref 11.7–13)
GFR SERPL CREATININE-BSD FRML MDRD: 30 ML/MIN/1.73
GLUCOSE BLD-MCNC: 117 MG/DL (ref 65–99)
HCT VFR BLD AUTO: 35.8 % (ref 35.6–45.5)
HGB BLD-MCNC: 10.4 G/DL (ref 11.9–15.5)
IMM GRANULOCYTES # BLD: 0.12 10*3/MM3 (ref 0–0.03)
IMM GRANULOCYTES NFR BLD: 1.8 % (ref 0–0.5)
LYMPHOCYTES # BLD AUTO: 1.71 10*3/MM3 (ref 0.9–4.8)
LYMPHOCYTES NFR BLD AUTO: 25.6 % (ref 19.6–45.3)
MCH RBC QN AUTO: 28.6 PG (ref 26.9–32)
MCHC RBC AUTO-ENTMCNC: 29.1 G/DL (ref 32.4–36.3)
MCV RBC AUTO: 98.4 FL (ref 80.5–98.2)
MONOCYTES # BLD AUTO: 0.78 10*3/MM3 (ref 0.2–1.2)
MONOCYTES NFR BLD AUTO: 11.7 % (ref 5–12)
NEUTROPHILS # BLD AUTO: 3.74 10*3/MM3 (ref 1.9–8.1)
NEUTROPHILS NFR BLD AUTO: 56.1 % (ref 42.7–76)
PHOSPHATE SERPL-MCNC: 3.4 MG/DL (ref 2.5–4.5)
PLATELET # BLD AUTO: 173 10*3/MM3 (ref 140–500)
PMV BLD AUTO: 10.7 FL (ref 6–12)
POTASSIUM BLD-SCNC: 3.8 MMOL/L (ref 3.5–5.2)
RBC # BLD AUTO: 3.64 10*6/MM3 (ref 3.9–5.2)
SODIUM BLD-SCNC: 145 MMOL/L (ref 136–145)
WBC NRBC COR # BLD: 6.67 10*3/MM3 (ref 4.5–10.7)

## 2017-04-05 PROCEDURE — 94799 UNLISTED PULMONARY SVC/PX: CPT

## 2017-04-05 PROCEDURE — 80069 RENAL FUNCTION PANEL: CPT | Performed by: INTERNAL MEDICINE

## 2017-04-05 PROCEDURE — 94660 CPAP INITIATION&MGMT: CPT

## 2017-04-05 PROCEDURE — 25010000002 VANCOMYCIN: Performed by: INTERNAL MEDICINE

## 2017-04-05 PROCEDURE — 85025 COMPLETE CBC W/AUTO DIFF WBC: CPT | Performed by: INTERNAL MEDICINE

## 2017-04-05 RX ORDER — IPRATROPIUM BROMIDE AND ALBUTEROL SULFATE 2.5; .5 MG/3ML; MG/3ML
3 SOLUTION RESPIRATORY (INHALATION)
Status: DISCONTINUED | OUTPATIENT
Start: 2017-04-05 | End: 2017-04-06

## 2017-04-05 RX ADMIN — PANTOPRAZOLE SODIUM 40 MG: 40 TABLET, DELAYED RELEASE ORAL at 05:35

## 2017-04-05 RX ADMIN — ROPINIROLE HYDROCHLORIDE 0.25 MG: 0.25 TABLET, FILM COATED ORAL at 15:59

## 2017-04-05 RX ADMIN — ROPINIROLE HYDROCHLORIDE 0.25 MG: 0.25 TABLET, FILM COATED ORAL at 20:20

## 2017-04-05 RX ADMIN — ACETAMINOPHEN 650 MG: 325 TABLET ORAL at 20:22

## 2017-04-05 RX ADMIN — IPRATROPIUM BROMIDE AND ALBUTEROL SULFATE 3 ML: .5; 3 SOLUTION RESPIRATORY (INHALATION) at 00:43

## 2017-04-05 RX ADMIN — ACETAMINOPHEN 650 MG: 325 TABLET ORAL at 16:07

## 2017-04-05 RX ADMIN — ROPINIROLE HYDROCHLORIDE 0.25 MG: 0.25 TABLET, FILM COATED ORAL at 08:38

## 2017-04-05 RX ADMIN — DULOXETINE HYDROCHLORIDE 30 MG: 30 CAPSULE, DELAYED RELEASE ORAL at 08:38

## 2017-04-05 RX ADMIN — IPRATROPIUM BROMIDE AND ALBUTEROL SULFATE 3 ML: .5; 3 SOLUTION RESPIRATORY (INHALATION) at 16:23

## 2017-04-05 RX ADMIN — VANCOMYCIN HYDROCHLORIDE 1750 MG: 1 INJECTION, POWDER, LYOPHILIZED, FOR SOLUTION INTRAVENOUS at 20:20

## 2017-04-05 RX ADMIN — IPRATROPIUM BROMIDE AND ALBUTEROL SULFATE 3 ML: .5; 3 SOLUTION RESPIRATORY (INHALATION) at 04:08

## 2017-04-05 RX ADMIN — AMLODIPINE BESYLATE 5 MG: 5 TABLET ORAL at 08:38

## 2017-04-05 RX ADMIN — LEVOTHYROXINE SODIUM 50 MCG: 50 TABLET ORAL at 05:35

## 2017-04-05 RX ADMIN — IPRATROPIUM BROMIDE AND ALBUTEROL SULFATE 3 ML: .5; 3 SOLUTION RESPIRATORY (INHALATION) at 13:01

## 2017-04-05 RX ADMIN — IPRATROPIUM BROMIDE AND ALBUTEROL SULFATE 3 ML: .5; 3 SOLUTION RESPIRATORY (INHALATION) at 20:38

## 2017-04-05 RX ADMIN — BUMETANIDE 1 MG: 1 TABLET ORAL at 08:38

## 2017-04-05 RX ADMIN — ACETAMINOPHEN 650 MG: 325 TABLET ORAL at 08:40

## 2017-04-05 RX ADMIN — IPRATROPIUM BROMIDE AND ALBUTEROL SULFATE 3 ML: .5; 3 SOLUTION RESPIRATORY (INHALATION) at 08:50

## 2017-04-05 RX ADMIN — DOCUSATE SODIUM 100 MG: 100 CAPSULE, LIQUID FILLED ORAL at 08:40

## 2017-04-05 RX ADMIN — DIVALPROEX SODIUM 125 MG: 125 CAPSULE, COATED PELLETS ORAL at 08:38

## 2017-04-05 RX ADMIN — DOCUSATE SODIUM 100 MG: 100 CAPSULE, LIQUID FILLED ORAL at 18:12

## 2017-04-05 NOTE — PLAN OF CARE
Problem: Patient Care Overview (Adult)  Goal: Plan of Care Review  Outcome: Ongoing (interventions implemented as appropriate)    04/05/17 8951   Coping/Psychosocial Response Interventions   Plan Of Care Reviewed With patient   Patient Care Overview   Progress improving   Outcome Evaluation   Outcome Summary/Follow up Plan Awaiting delivery of Triology Bipap to NH for pt's use after d/c.       Goal: Discharge Needs Assessment  Outcome: Ongoing (interventions implemented as appropriate)    Problem: Respiratory Insufficiency (Adult)  Goal: Acid/Base Balance  Outcome: Ongoing (interventions implemented as appropriate)  Goal: Effective Ventilation  Outcome: Ongoing (interventions implemented as appropriate)    Problem: Fall Risk (Adult)  Goal: Absence of Falls  Outcome: Ongoing (interventions implemented as appropriate)    Problem: Pressure Ulcer (Adult)  Goal: Signs and Symptoms of Listed Potential Problems Will be Absent or Manageable (Pressure Ulcer)  Outcome: Ongoing (interventions implemented as appropriate)

## 2017-04-05 NOTE — PROGRESS NOTES
Continued Stay Note  [facility]     Patient Name: Maryjane Amaral  MRN: [id number]  Today's Date: 4/5/2017    Admit Date: 3/26/2017          Discharge Plan       04/05/17 0850    Case Management/Social Work Plan    Plan [facility]    Additional Comments Settings for trilogy obtained per Alva Fletcher liatevin.  She will contact CCP when equiptment is obtained  CCP following              Discharge Codes     None            Aviva Woodward RN

## 2017-04-05 NOTE — PLAN OF CARE
Problem: Patient Care Overview (Adult)  Goal: Plan of Care Review  Outcome: Ongoing (interventions implemented as appropriate)    04/04/17 2106 04/05/17 0555   Coping/Psychosocial Response Interventions   Plan Of Care Reviewed With patient --    Patient Care Overview   Progress --  no change   Outcome Evaluation   Outcome Summary/Follow up Plan --  Pt lifted in mechanical lift to zero out bed. Pt VSS. Safety maintained.        Goal: Adult Individualization and Mutuality  Outcome: Ongoing (interventions implemented as appropriate)  Goal: Discharge Needs Assessment  Outcome: Ongoing (interventions implemented as appropriate)    Problem: Respiratory Insufficiency (Adult)  Goal: Acid/Base Balance  Outcome: Ongoing (interventions implemented as appropriate)  Goal: Effective Ventilation  Outcome: Ongoing (interventions implemented as appropriate)    Problem: Fall Risk (Adult)  Goal: Absence of Falls  Outcome: Ongoing (interventions implemented as appropriate)    Problem: Pressure Ulcer (Adult)  Goal: Signs and Symptoms of Listed Potential Problems Will be Absent or Manageable (Pressure Ulcer)  Outcome: Ongoing (interventions implemented as appropriate)

## 2017-04-05 NOTE — PLAN OF CARE
Problem: Patient Care Overview (Adult)  Goal: Plan of Care Review  Outcome: Ongoing (interventions implemented as appropriate)    04/04/17 2015   Coping/Psychosocial Response Interventions   Plan Of Care Reviewed With patient   Patient Care Overview   Progress progress toward functional goals as expected       Goal: Adult Individualization and Mutuality  Outcome: Ongoing (interventions implemented as appropriate)  Goal: Discharge Needs Assessment  Outcome: Ongoing (interventions implemented as appropriate)    Problem: Respiratory Insufficiency (Adult)  Goal: Acid/Base Balance  Outcome: Ongoing (interventions implemented as appropriate)    Problem: Fall Risk (Adult)  Goal: Absence of Falls  Outcome: Ongoing (interventions implemented as appropriate)    Problem: Pressure Ulcer (Adult)  Goal: Signs and Symptoms of Listed Potential Problems Will be Absent or Manageable (Pressure Ulcer)  Outcome: Ongoing (interventions implemented as appropriate)

## 2017-04-05 NOTE — PROGRESS NOTES
Dr. TRACEY King    [address]    4/5/2017    Patient ID:  Name:  Maryjane Amaral  MRN:  [id number]  1945  72 y.o.  female            CC/Reason for visit: Follow-up for acute respiratory failure, COPD, cellulitis versus hematoma    HPI: Patient denies any new complaints.  Waiting for her noninvasive Trilogy home volume ventilation system    Vitals:  Vitals:    04/05/17 0844 04/05/17 0850 04/05/17 1301 04/05/17 1318   BP: 114/60   119/57   BP Location: Left arm   Left arm   Patient Position: Lying   Lying   Pulse: 90 89 90 93   Resp: 20 24 24 24   Temp:    98 °F (36.7 °C)   TempSrc:    Oral   SpO2: 94% 93% 93% 91%   Weight:       Height:               Body mass index is 71.84 kg/(m^2).    Intake/Output Summary (Last 24 hours) at 04/05/17 1515  Last data filed at 04/05/17 1318   Gross per 24 hour   Intake              600 ml   Output                0 ml   Net              600 ml       Exam:  GEN:  No distress, morbidly obese  EYES:   EOM-i, anicteric sclera bilat  ENT:    External ears/nose normal, OP clear  NECK:  Supple, midline trachea  LUNGS: Diminished breath sounds throughout, but no wheezing, nonlabored breathing  CV:  Normal S1S2, without murmur, 3+ edema both ankles  ABD:  Non tender, morbidly obese    Scheduled meds:    amLODIPine 5 mg Oral Daily   bumetanide 1 mg Oral Daily   Divalproex Sodium 125 mg Oral Daily   docusate sodium 100 mg Oral BID   DULoxetine 30 mg Oral Daily   ipratropium-albuterol 3 mL Nebulization Q4H - RT   levoFLOXacin 750 mg Intravenous Q24H   levothyroxine 50 mcg Oral Q AM   pantoprazole 40 mg Oral Q AM   rOPINIRole 0.25 mg Oral TID   vancomycin 1,750 mg Intravenous Q24H     IV meds:                        Pharmacy to dose vancomycin        Data Review:   I reviewed the patient's medications and new clinical results.  Lab Results   Component Value Date    CALCIUM 8.9 04/05/2017    PHOS 3.4 04/05/2017     Results from last 7 days  Lab Units 04/05/17  0639  04/05/17  0604 04/04/17  0424 04/03/17  0436   SODIUM mmol/L  --  145 143 141   POTASSIUM mmol/L  --  3.8 3.5 3.6   CHLORIDE mmol/L  --  96* 94* 93*   TOTAL CO2 mmol/L  --  34.0* 36.6* 36.2*   BUN mg/dL  --  12 11 12   CREATININE mg/dL  --  1.66* 1.40* 1.57*   GLUCOSE mg/dL  --  117* 105* 107*   CALCIUM mg/dL  --  8.9 9.0 9.2   WBC 10*3/mm3 6.67  --  5.65 5.30   HEMOGLOBIN g/dL 10.4*  --  10.3* 10.6*   PLATELETS 10*3/mm3 173  --  176 158         ASSESSMENT:   Active Problems:    Acute respiratory failure with hypercapnia   COPD  Cellulitis versus hematoma  Super obesity  Acute kidney injury    PLAN:  We will send the patient back to nursing home tomorrow.  Hopefully with Trilogy machine    Iron King MD  4/5/2017

## 2017-04-06 PROBLEM — J44.1 COPD EXACERBATION (HCC): Status: ACTIVE | Noted: 2017-04-06

## 2017-04-06 PROBLEM — J44.1 COPD EXACERBATION (HCC): Status: RESOLVED | Noted: 2017-04-06 | Resolved: 2017-04-06

## 2017-04-06 LAB
ALBUMIN SERPL-MCNC: 3.7 G/DL (ref 3.5–5.2)
ANION GAP SERPL CALCULATED.3IONS-SCNC: 9.2 MMOL/L
BASOPHILS # BLD AUTO: 0.02 10*3/MM3 (ref 0–0.2)
BASOPHILS NFR BLD AUTO: 0.3 % (ref 0–1.5)
BUN BLD-MCNC: 12 MG/DL (ref 8–23)
BUN/CREAT SERPL: 7.9 (ref 7–25)
CALCIUM SPEC-SCNC: 9.1 MG/DL (ref 8.6–10.5)
CHLORIDE SERPL-SCNC: 97 MMOL/L (ref 98–107)
CO2 SERPL-SCNC: 36.8 MMOL/L (ref 22–29)
CREAT BLD-MCNC: 1.51 MG/DL (ref 0.57–1)
DEPRECATED RDW RBC AUTO: 50.5 FL (ref 37–54)
EOSINOPHIL # BLD AUTO: 0.33 10*3/MM3 (ref 0–0.7)
EOSINOPHIL NFR BLD AUTO: 5.5 % (ref 0.3–6.2)
ERYTHROCYTE [DISTWIDTH] IN BLOOD BY AUTOMATED COUNT: 14.3 % (ref 11.7–13)
GFR SERPL CREATININE-BSD FRML MDRD: 34 ML/MIN/1.73
GLUCOSE BLD-MCNC: 119 MG/DL (ref 65–99)
GLUCOSE BLDC GLUCOMTR-MCNC: 122 MG/DL (ref 70–130)
HCT VFR BLD AUTO: 36.3 % (ref 35.6–45.5)
HGB BLD-MCNC: 10.7 G/DL (ref 11.9–15.5)
IMM GRANULOCYTES # BLD: 0.16 10*3/MM3 (ref 0–0.03)
IMM GRANULOCYTES NFR BLD: 2.6 % (ref 0–0.5)
LYMPHOCYTES # BLD AUTO: 1.52 10*3/MM3 (ref 0.9–4.8)
LYMPHOCYTES NFR BLD AUTO: 25.2 % (ref 19.6–45.3)
MCH RBC QN AUTO: 28.4 PG (ref 26.9–32)
MCHC RBC AUTO-ENTMCNC: 29.5 G/DL (ref 32.4–36.3)
MCV RBC AUTO: 96.3 FL (ref 80.5–98.2)
MONOCYTES # BLD AUTO: 0.67 10*3/MM3 (ref 0.2–1.2)
MONOCYTES NFR BLD AUTO: 11.1 % (ref 5–12)
NEUTROPHILS # BLD AUTO: 3.34 10*3/MM3 (ref 1.9–8.1)
NEUTROPHILS NFR BLD AUTO: 55.3 % (ref 42.7–76)
PHOSPHATE SERPL-MCNC: 3.8 MG/DL (ref 2.5–4.5)
PLATELET # BLD AUTO: 216 10*3/MM3 (ref 140–500)
PMV BLD AUTO: 10.7 FL (ref 6–12)
POTASSIUM BLD-SCNC: 3.6 MMOL/L (ref 3.5–5.2)
RBC # BLD AUTO: 3.77 10*6/MM3 (ref 3.9–5.2)
SODIUM BLD-SCNC: 143 MMOL/L (ref 136–145)
WBC NRBC COR # BLD: 6.04 10*3/MM3 (ref 4.5–10.7)

## 2017-04-06 PROCEDURE — 80069 RENAL FUNCTION PANEL: CPT | Performed by: INTERNAL MEDICINE

## 2017-04-06 PROCEDURE — 94799 UNLISTED PULMONARY SVC/PX: CPT

## 2017-04-06 PROCEDURE — 85025 COMPLETE CBC W/AUTO DIFF WBC: CPT | Performed by: INTERNAL MEDICINE

## 2017-04-06 PROCEDURE — 82962 GLUCOSE BLOOD TEST: CPT

## 2017-04-06 RX ORDER — ALBUTEROL SULFATE 2.5 MG/3ML
2.5 SOLUTION RESPIRATORY (INHALATION) 4 TIMES DAILY PRN
Qty: 125 VIAL | Refills: 11 | Status: SHIPPED | OUTPATIENT
Start: 2017-04-06

## 2017-04-06 RX ORDER — CLONAZEPAM 0.5 MG/1
0.25 TABLET ORAL 2 TIMES DAILY PRN
Qty: 10 TABLET | Refills: 0 | Status: SHIPPED | OUTPATIENT
Start: 2017-04-06

## 2017-04-06 RX ORDER — HYDROCODONE BITARTRATE AND ACETAMINOPHEN 5; 325 MG/1; MG/1
1 TABLET ORAL EVERY 8 HOURS PRN
Qty: 10 TABLET | Refills: 0 | Status: SHIPPED | OUTPATIENT
Start: 2017-04-06

## 2017-04-06 RX ADMIN — PANTOPRAZOLE SODIUM 40 MG: 40 TABLET, DELAYED RELEASE ORAL at 05:16

## 2017-04-06 RX ADMIN — ROPINIROLE HYDROCHLORIDE 0.25 MG: 0.25 TABLET, FILM COATED ORAL at 17:28

## 2017-04-06 RX ADMIN — AMLODIPINE BESYLATE 5 MG: 5 TABLET ORAL at 09:45

## 2017-04-06 RX ADMIN — IPRATROPIUM BROMIDE AND ALBUTEROL SULFATE 3 ML: .5; 3 SOLUTION RESPIRATORY (INHALATION) at 07:27

## 2017-04-06 RX ADMIN — LEVOTHYROXINE SODIUM 50 MCG: 50 TABLET ORAL at 05:16

## 2017-04-06 RX ADMIN — IPRATROPIUM BROMIDE AND ALBUTEROL SULFATE 3 ML: .5; 3 SOLUTION RESPIRATORY (INHALATION) at 14:41

## 2017-04-06 RX ADMIN — DULOXETINE HYDROCHLORIDE 30 MG: 30 CAPSULE, DELAYED RELEASE ORAL at 09:45

## 2017-04-06 RX ADMIN — ROPINIROLE HYDROCHLORIDE 0.25 MG: 0.25 TABLET, FILM COATED ORAL at 09:45

## 2017-04-06 RX ADMIN — DIVALPROEX SODIUM 125 MG: 125 CAPSULE, COATED PELLETS ORAL at 09:45

## 2017-04-06 RX ADMIN — DOCUSATE SODIUM 100 MG: 100 CAPSULE, LIQUID FILLED ORAL at 09:45

## 2017-04-06 RX ADMIN — ROPINIROLE HYDROCHLORIDE 0.25 MG: 0.25 TABLET, FILM COATED ORAL at 20:59

## 2017-04-06 RX ADMIN — BUMETANIDE 1 MG: 1 TABLET ORAL at 09:45

## 2017-04-06 RX ADMIN — ACETAMINOPHEN 650 MG: 325 TABLET ORAL at 05:18

## 2017-04-06 NOTE — PROGRESS NOTES
[facility]    Physicians Statement of Medical Necessity for Ambulance Transportation    It is medically necessary for:    Patient Name: Maryjane Amaral    Insurance Information:  [id number]  Medicare    [id number]  Medicaid    To be transported by ambulance:    From (if nursing facility, specify level of care: skilled, shelter, etc)  [facility]    To (specify level of care if nursing facility): [facility]    Date of Service: 4/6/2017      For dialysis patients state date dialysis began:     Diagnosis: Acute respiratory failure with hypercapnia   COPD  Cellulitis versus hematoma  Super obesity  Acute kidney injury       Past Medical/Surgical History:  Past Medical History:   Diagnosis Date   • Anemia    • Anxiety    • CHF (congestive heart failure)    • COPD (chronic obstructive pulmonary disease)    • Coronary artery disease    • Depression    • Diabetes mellitus    • GERD (gastroesophageal reflux disease)    • Hyperlipidemia    • Hypertension    • Hypothyroidism    • Insomnia    • Kidney failure    • Morbid obesity    • Osteoarthritis    • Pneumonia    • Polyneuropathy    • Respiratory failure    • Sepsis    • Weakness       Past Surgical History:   Procedure Laterality Date   • CHOLECYSTECTOMY     • HERNIA REPAIR     • HYSTERECTOMY     • JOINT REPLACEMENT          Current Objective Medical Evidence(including physical exam finding to support reason for limitations):    Immobilization syndrome  Bedridden  Requires oxygen  Unable to sit at 90 degree angle    Other: fall risk   Pt is bariatric      Physician Signature:           (RN,NP,PA,CAN, Discharge Planner) Date/Time: 4/6/2017  3:35 PM       Printed Name:    _Aviva Woodward RN  CCP  _________________________________    [facility]   Phone: [phone] Phone: [phone] Phone: [phone]   Fax: [phone] Fax: [phone] Fax: [phone]

## 2017-04-06 NOTE — DISCHARGE SUMMARY
Patient Identification:  Name: Maryjane Amaral  Age: 72 y.o.  Sex: female  :  1945  MRN: [id number]  Primary Care Physician: Rubi Adkins MD    Admit date: 3/26/2017  Discharge date and time: 2017   Discharged Condition: good    Discharge Diagnoses:   Acute respiratory failure with hypercapnia  COPD  Cellulitis versus hematoma  Super obesity  Acute kidney injury    Hospital Course: Maryjane Amaral presented to [facility]  with shortness of breath and volume overload.  She was treated aggressively with diuretics, noninvasive ventilation in the form of BiPAP, oxygen, bronchodilators and close monitoring.    She was treated empirically for suspected cellulitis antibiotics for at least 8 days, but all cultures have been negative to date, and all antibiotics were stopped yesterday.    For her COPD, she is now back to baseline.  It is a combination of morbid obesity as well as chronic obstructive lung disease.  She is to wear her Trilogy noninvasive volume ventilator machine every night, as well as during nap time.  She is to continue her regular maintenance bronchodilators as she has always done prior to admission.    Her kidney disease needs to be monitored with weekly lab determinations of renal function.  She is to continue on her diuretics.    I am not sure why she is on chronic prednisone, but this has been discontinued for several days, and she has done well without any significant hypotension.  This is not to be restarted indefinitely.    Consults:   IP CONSULT TO PULMONOLOGY  IP CONSULT TO NUTRITION SERVICES  IP CONSULT TO CASE MANAGEMENT   IP CONSULT TO CASE MANAGEMENT   IP CONSULT TO CASE MANAGEMENT   IP CONSULT TO CASE MANAGEMENT     Significant Diagnostic Studies:   CBC:   Results from last 7 days  Lab Units 17  0520   WBC 10*3/mm3 6.04   RBC 10*6/mm3 3.77*     BMP:   Results from last 7 days  Lab Units  04/06/17  0520   GLUCOSE mg/dL 119*   TOTAL CO2 mmol/L 36.8*   BUN mg/dL 12   CREATININE mg/dL 1.51*   CALCIUM mg/dL 9.1     Coagulation: No results found for: INR, APTT  Cardiac markers:     ABGs:       Invalid input(s): PO2, PCO2  Radiology review:   Imaging Results (most recent)     Procedure Component Value Units Date/Time    XR Chest 2 View [[id number]] Collected:  03/26/17 1204     Updated:  03/26/17 1209    Narrative:       PA AND LATERAL CHEST     CLINICAL HISTORY: COPD. CHF. Dyspnea.     Compared to the previous chest x-ray dated 06/19/2016.     Visualization of the chest is quite limited due to the patient's  extremely large size. The lateral views are nondiagnostic. The AP view  demonstrates moderate cardiomegaly and mild vascular congestion and  probable mild interstitial pulmonary edema. A tiny left pleural effusion  is suspected. No obvious focal areas of consolidation are identified.     This report was finalized on 3/26/2017 12:06 PM by Dr. Donny Simpson MD.       XR Chest 1 View [[id number]] Collected:  03/27/17 0449     Updated:  03/27/17 0541    Narrative:       X-RAY CHEST 1 VIEW.     HISTORY: Follow-up CHF.     COMPARISON: 03/26/2017.     FINDINGS:  Cardiomegaly and low lung volumes.         Severe pulmonary congestion remains unchanged.              Impression:       Persistent pulmonary edema, overall no significant change.         This report was finalized on 3/27/2017 5:38 AM by Dr. Jany Ayon MD.       CT Lower Extremity Right Without Contrast [[id number]] Collected:  04/03/17 1523     Updated:  04/03/17 1651    Narrative:       RIGHT AND LEFT LEG CT WITHOUT CONTRAST     HISTORY: Posterior right thigh and posterior left knee pain. Possible  hematoma or abscess.     TECHNIQUE: An axial CT showing the right and left legs from above the  hips to the feet along with multiplanar reformatted images is provided.     RIGHT LEG FINDINGS: There is diffuse deconditioning of the musculature  around the  right hip, right thigh, and right lower leg. Nonspecific  subcutaneous edema is observed along the posterior and lateral aspect of  the thigh and lower leg. There is no hematoma or other focal fluid  collection. Degenerative change is observed at the right knee, advanced.  There is no bone lesion in the femur, tibia, or fibula.     LEFT LEG FINDINGS: There is muscular deconditioning around the left hip,  left thigh, and left lower leg. Subcutaneous edema is observed along the  posterior and lateral aspect of the thigh and lower leg and there is  advanced osteoarthritic change at the left knee. There is no abscess,  hematoma, or other focal fluid collection in the left leg. There is no  mass. There is no bone lesion.     Detail within the pelvis is somewhat limited due to beam hardening  artifact secondary to the patient's body habitus. The visualized  intrapelvic bowel appears normal and no lymphadenopathy, mass, or free  fluid is identified in the pelvis.       Impression:       There is muscular deconditioning in both legs and some  nonspecific subcutaneous edema. However, no abscess, hematoma, or mass  is present. Advanced osteoarthritic change is incidentally noted at both  knees.     This report was finalized on 4/3/2017 4:48 PM by Dr. Macho Christensen MD.       CT Lower Extremity Left Without Contrast [[id number]] Collected:  04/03/17 1523     Updated:  04/03/17 1651    Narrative:       RIGHT AND LEFT LEG CT WITHOUT CONTRAST     HISTORY: Posterior right thigh and posterior left knee pain. Possible  hematoma or abscess.     TECHNIQUE: An axial CT showing the right and left legs from above the  hips to the feet along with multiplanar reformatted images is provided.     RIGHT LEG FINDINGS: There is diffuse deconditioning of the musculature  around the right hip, right thigh, and right lower leg. Nonspecific  subcutaneous edema is observed along the posterior and lateral aspect of  the thigh and lower leg. There is  no hematoma or other focal fluid  collection. Degenerative change is observed at the right knee, advanced.  There is no bone lesion in the femur, tibia, or fibula.     LEFT LEG FINDINGS: There is muscular deconditioning around the left hip,  left thigh, and left lower leg. Subcutaneous edema is observed along the  posterior and lateral aspect of the thigh and lower leg and there is  advanced osteoarthritic change at the left knee. There is no abscess,  hematoma, or other focal fluid collection in the left leg. There is no  mass. There is no bone lesion.     Detail within the pelvis is somewhat limited due to beam hardening  artifact secondary to the patient's body habitus. The visualized  intrapelvic bowel appears normal and no lymphadenopathy, mass, or free  fluid is identified in the pelvis.       Impression:       There is muscular deconditioning in both legs and some  nonspecific subcutaneous edema. However, no abscess, hematoma, or mass  is present. Advanced osteoarthritic change is incidentally noted at both  knees.     This report was finalized on 4/3/2017 4:48 PM by Dr. Macho Christensen MD.             Discharge Exam:  Alert and oriented x 4, in NAD, morbidly obese  Supple neck, midline trach  RRR, no m/r/g, no edema  Diminished breath sounds bilaterally, no wheezing, nonlabored  No clubbing or cyanosis     Disposition:  Skilled nursing facility     Medication Reconciliation: Please see electronically completed Med Rec.    Total time spent discharging patient including evaluation, medication reconciliation, arranging follow up, and post hospitalization instructions and education total time exceeds 30 minutes.    Signed:  Iron King MD  4/6/2017  4:31 PM

## 2017-04-06 NOTE — PLAN OF CARE
Problem: Patient Care Overview (Adult)  Goal: Plan of Care Review  Outcome: Ongoing (interventions implemented as appropriate)    04/06/17 0001 04/06/17 0434   Coping/Psychosocial Response Interventions   Plan Of Care Reviewed With patient --    Patient Care Overview   Progress --  no change   Outcome Evaluation   Outcome Summary/Follow up Plan --  Awaiting trilogy for d/c.        Goal: Adult Individualization and Mutuality  Outcome: Ongoing (interventions implemented as appropriate)  Goal: Discharge Needs Assessment  Outcome: Ongoing (interventions implemented as appropriate)    Problem: Respiratory Insufficiency (Adult)  Goal: Acid/Base Balance  Outcome: Ongoing (interventions implemented as appropriate)  Goal: Effective Ventilation  Outcome: Ongoing (interventions implemented as appropriate)    Problem: Fall Risk (Adult)  Goal: Absence of Falls  Outcome: Ongoing (interventions implemented as appropriate)    Problem: Pressure Ulcer (Adult)  Goal: Signs and Symptoms of Listed Potential Problems Will be Absent or Manageable (Pressure Ulcer)  Outcome: Ongoing (interventions implemented as appropriate)

## 2017-04-06 NOTE — NURSING NOTE
"Tried calling report to [facility] and was told they \"aren't accepting the patient tonight\" because they do not have the Trilogy machine.  The coordinator said although she received Dr. South' orders, she wasn't able to get the machine because the company was closed at 4PM.  Dr. Mcfarland called to update.  CCP gone for the day.  Discharge complete; will hopefully be able to go early AM.  "

## 2017-04-06 NOTE — PROGRESS NOTES
Continued Stay Note  [facility]     Patient Name: Maryjane Amaral  MRN: [id number]  Today's Date: 4/6/2017    Admit Date: 3/26/2017          Discharge Plan       04/06/17 0833    Case Management/Social Work Plan    Plan [facility]    Additional Comments Alva from facility notified that pt has trilogy at the facility so she may come back to [facility].  Message to Dr Brock              Discharge Codes     None            Aviva Woodward RN

## 2017-04-06 NOTE — PROGRESS NOTES
Continued Stay Note  [facility]     Patient Name: Maryjane Amaral  MRN: [id number]  Today's Date: 4/6/2017    Admit Date: 3/26/2017          Discharge Plan       04/06/17 1604    Case Management/Social Work Plan    Additional Comments faxed order to facility for trilogy setting  Amb set for 20:00  No other needs identified              Discharge Codes     None            Aviva Woodward RN

## 2017-04-07 LAB
ALBUMIN SERPL-MCNC: 3.5 G/DL (ref 3.5–5.2)
ANION GAP SERPL CALCULATED.3IONS-SCNC: 11.6 MMOL/L
BASOPHILS # BLD AUTO: 0.01 10*3/MM3 (ref 0–0.2)
BASOPHILS NFR BLD AUTO: 0.2 % (ref 0–1.5)
BUN BLD-MCNC: 11 MG/DL (ref 8–23)
BUN/CREAT SERPL: 8.6 (ref 7–25)
CALCIUM SPEC-SCNC: 9 MG/DL (ref 8.6–10.5)
CHLORIDE SERPL-SCNC: 98 MMOL/L (ref 98–107)
CO2 SERPL-SCNC: 35.4 MMOL/L (ref 22–29)
CREAT BLD-MCNC: 1.28 MG/DL (ref 0.57–1)
DEPRECATED RDW RBC AUTO: 50.9 FL (ref 37–54)
EOSINOPHIL # BLD AUTO: 0.36 10*3/MM3 (ref 0–0.7)
EOSINOPHIL NFR BLD AUTO: 5.9 % (ref 0.3–6.2)
ERYTHROCYTE [DISTWIDTH] IN BLOOD BY AUTOMATED COUNT: 14.2 % (ref 11.7–13)
GFR SERPL CREATININE-BSD FRML MDRD: 41 ML/MIN/1.73
GLUCOSE BLD-MCNC: 124 MG/DL (ref 65–99)
HCT VFR BLD AUTO: 36.7 % (ref 35.6–45.5)
HGB BLD-MCNC: 10.6 G/DL (ref 11.9–15.5)
IMM GRANULOCYTES # BLD: 0.12 10*3/MM3 (ref 0–0.03)
IMM GRANULOCYTES NFR BLD: 2 % (ref 0–0.5)
LYMPHOCYTES # BLD AUTO: 1.37 10*3/MM3 (ref 0.9–4.8)
LYMPHOCYTES NFR BLD AUTO: 22.3 % (ref 19.6–45.3)
MCH RBC QN AUTO: 28.3 PG (ref 26.9–32)
MCHC RBC AUTO-ENTMCNC: 28.9 G/DL (ref 32.4–36.3)
MCV RBC AUTO: 97.9 FL (ref 80.5–98.2)
MONOCYTES # BLD AUTO: 0.54 10*3/MM3 (ref 0.2–1.2)
MONOCYTES NFR BLD AUTO: 8.8 % (ref 5–12)
NEUTROPHILS # BLD AUTO: 3.74 10*3/MM3 (ref 1.9–8.1)
NEUTROPHILS NFR BLD AUTO: 60.8 % (ref 42.7–76)
PHOSPHATE SERPL-MCNC: 4.4 MG/DL (ref 2.5–4.5)
PLATELET # BLD AUTO: 225 10*3/MM3 (ref 140–500)
PMV BLD AUTO: 10.6 FL (ref 6–12)
POTASSIUM BLD-SCNC: 3.5 MMOL/L (ref 3.5–5.2)
RBC # BLD AUTO: 3.75 10*6/MM3 (ref 3.9–5.2)
SODIUM BLD-SCNC: 145 MMOL/L (ref 136–145)
WBC NRBC COR # BLD: 6.14 10*3/MM3 (ref 4.5–10.7)

## 2017-04-07 PROCEDURE — 80069 RENAL FUNCTION PANEL: CPT | Performed by: INTERNAL MEDICINE

## 2017-04-07 PROCEDURE — 94799 UNLISTED PULMONARY SVC/PX: CPT

## 2017-04-07 PROCEDURE — 85025 COMPLETE CBC W/AUTO DIFF WBC: CPT | Performed by: INTERNAL MEDICINE

## 2017-04-07 RX ORDER — IPRATROPIUM BROMIDE AND ALBUTEROL SULFATE 2.5; .5 MG/3ML; MG/3ML
3 SOLUTION RESPIRATORY (INHALATION)
Status: DISCONTINUED | OUTPATIENT
Start: 2017-04-07 | End: 2017-04-08

## 2017-04-07 RX ADMIN — ROPINIROLE HYDROCHLORIDE 0.25 MG: 0.25 TABLET, FILM COATED ORAL at 20:54

## 2017-04-07 RX ADMIN — PANTOPRAZOLE SODIUM 40 MG: 40 TABLET, DELAYED RELEASE ORAL at 07:58

## 2017-04-07 RX ADMIN — ACETAMINOPHEN 650 MG: 325 TABLET ORAL at 16:14

## 2017-04-07 RX ADMIN — DIVALPROEX SODIUM 125 MG: 125 CAPSULE, COATED PELLETS ORAL at 09:34

## 2017-04-07 RX ADMIN — ROPINIROLE HYDROCHLORIDE 0.25 MG: 0.25 TABLET, FILM COATED ORAL at 16:10

## 2017-04-07 RX ADMIN — BUMETANIDE 1 MG: 1 TABLET ORAL at 09:34

## 2017-04-07 RX ADMIN — DULOXETINE HYDROCHLORIDE 30 MG: 30 CAPSULE, DELAYED RELEASE ORAL at 09:34

## 2017-04-07 RX ADMIN — AMLODIPINE BESYLATE 5 MG: 5 TABLET ORAL at 09:34

## 2017-04-07 RX ADMIN — ACETAMINOPHEN 650 MG: 325 TABLET ORAL at 09:34

## 2017-04-07 RX ADMIN — ROPINIROLE HYDROCHLORIDE 0.25 MG: 0.25 TABLET, FILM COATED ORAL at 09:34

## 2017-04-07 RX ADMIN — ACETAMINOPHEN 650 MG: 325 TABLET ORAL at 20:54

## 2017-04-07 RX ADMIN — IPRATROPIUM BROMIDE AND ALBUTEROL SULFATE 3 ML: .5; 3 SOLUTION RESPIRATORY (INHALATION) at 21:52

## 2017-04-07 RX ADMIN — LEVOTHYROXINE SODIUM 50 MCG: 50 TABLET ORAL at 07:58

## 2017-04-07 NOTE — PROGRESS NOTES
[address]    4/7/2017    Patient ID:  Name:  Maryjane Amaral  MRN:  [id number]  1945  72 y.o.  female            CC/Reason for visit: Follow-up for acute respiratory failure, COPD, cellulitis versus hematoma    HPI: Patient denies any new complaints.  Waiting for her noninvasive Trilogy home volume ventilation system. She is using BIPAP at night here in the hospital    Vitals:  Vitals:    04/06/17 2326 04/07/17 0357 04/07/17 0700 04/07/17 1430   BP: 145/69  140/63 149/61   BP Location:   Left arm Left arm   Patient Position:   Lying Lying   Pulse: 90  93    Resp: 20 18 18 18   Temp: 98.2 °F (36.8 °C)  98.3 °F (36.8 °C) 97.9 °F (36.6 °C)   TempSrc: Oral  Oral Oral   SpO2: 94%  95%    Weight:       Height:               Body mass index is 72.16 kg/(m^2).    Intake/Output Summary (Last 24 hours) at 04/07/17 1829  Last data filed at 04/07/17 1700   Gross per 24 hour   Intake              560 ml   Output                0 ml   Net              560 ml       Exam:  GEN:  No distress, morbidly obese  EYES:   EOM-i, anicteric sclera bilat  ENT:    External ears/nose normal, OP clear  NECK:  Supple, midline trachea  LUNGS: Diminished breath sounds throughout, but no wheezing, nonlabored breathing  CV:  Normal S1S2, without murmur, 3+ edema both ankles  ABD:  Non tender, morbidly obese    Scheduled meds:      amLODIPine 5 mg Oral Daily   bumetanide 1 mg Oral Daily   Divalproex Sodium 125 mg Oral Daily   DULoxetine 30 mg Oral Daily   levothyroxine 50 mcg Oral Q AM   pantoprazole 40 mg Oral Q AM   rOPINIRole 0.25 mg Oral TID     IV meds:                           Data Review:   I reviewed the patient's medications and new clinical results.  Lab Results   Component Value Date    CALCIUM 9.0 04/07/2017    PHOS 4.4 04/07/2017       Results from last 7 days  Lab Units 04/07/17  0451 04/06/17  0520 04/05/17  0639 04/05/17  0604   SODIUM mmol/L 145 143  --  145   POTASSIUM mmol/L 3.5 3.6  --  3.8    CHLORIDE mmol/L 98 97*  --  96*   TOTAL CO2 mmol/L 35.4* 36.8*  --  34.0*   BUN mg/dL 11 12  --  12   CREATININE mg/dL 1.28* 1.51*  --  1.66*   GLUCOSE mg/dL 124* 119*  --  117*   CALCIUM mg/dL 9.0 9.1  --  8.9   WBC 10*3/mm3 6.14 6.04 6.67  --    HEMOGLOBIN g/dL 10.6* 10.7* 10.4*  --    PLATELETS 10*3/mm3 225 216 173  --          ASSESSMENT:   Principal Problem:    Acute respiratory failure with hypercapnia   COPD  Cellulitis versus hematoma  Super obesity  Acute kidney injury    PLAN:  We will send the patient back to nursing home tomorrow.  Hopefully with Trilogy machine    Lee Ann Fermin MD  4/7/2017

## 2017-04-07 NOTE — PLAN OF CARE
Problem: Patient Care Overview (Adult)  Goal: Plan of Care Review  Outcome: Ongoing (interventions implemented as appropriate)    04/07/17 1732   Coping/Psychosocial Response Interventions   Plan Of Care Reviewed With patient   Patient Care Overview   Progress progress toward functional goals as expected   Outcome Evaluation   Outcome Summary/Follow up Plan Patient is waiting for trilogy machine to be delivered to the rehab facility so that she can leave       Goal: Adult Individualization and Mutuality  Outcome: Ongoing (interventions implemented as appropriate)    04/07/17 1732   Individualization   Patient Specific Interventions monitor labs and vitals       Goal: Discharge Needs Assessment  Outcome: Ongoing (interventions implemented as appropriate)    Problem: Respiratory Insufficiency (Adult)  Goal: Identify Related Risk Factors and Signs and Symptoms  Outcome: Ongoing (interventions implemented as appropriate)    Problem: Fall Risk (Adult)  Goal: Absence of Falls  Outcome: Ongoing (interventions implemented as appropriate)    Problem: Pressure Ulcer (Adult)  Goal: Signs and Symptoms of Listed Potential Problems Will be Absent or Manageable (Pressure Ulcer)  Outcome: Ongoing (interventions implemented as appropriate)

## 2017-04-07 NOTE — PLAN OF CARE
Problem: Patient Care Overview (Adult)  Goal: Plan of Care Review  Outcome: Ongoing (interventions implemented as appropriate)    04/07/17 0025   Coping/Psychosocial Response Interventions   Plan Of Care Reviewed With patient   Patient Care Overview   Progress improving   Outcome Evaluation   Outcome Summary/Follow up Plan Pt complains of some arthritis pain. She is ready for d/c asap in the AM. 800 mónica restriciton diet. VSS. Continue to monitor. Safety maintained.          Problem: Respiratory Insufficiency (Adult)  Goal: Identify Related Risk Factors and Signs and Symptoms  Outcome: Ongoing (interventions implemented as appropriate)    Problem: Fall Risk (Adult)  Goal: Absence of Falls  Outcome: Ongoing (interventions implemented as appropriate)    Problem: Pressure Ulcer (Adult)  Goal: Signs and Symptoms of Listed Potential Problems Will be Absent or Manageable (Pressure Ulcer)  Outcome: Ongoing (interventions implemented as appropriate)

## 2017-04-08 VITALS
WEIGHT: 293 LBS | RESPIRATION RATE: 18 BRPM | OXYGEN SATURATION: 94 % | HEIGHT: 64 IN | BODY MASS INDEX: 50.02 KG/M2 | TEMPERATURE: 98.5 F | SYSTOLIC BLOOD PRESSURE: 131 MMHG | HEART RATE: 91 BPM | DIASTOLIC BLOOD PRESSURE: 81 MMHG

## 2017-04-08 LAB
ALBUMIN SERPL-MCNC: 3.5 G/DL (ref 3.5–5.2)
ANION GAP SERPL CALCULATED.3IONS-SCNC: 12.3 MMOL/L
BASOPHILS # BLD AUTO: 0.01 10*3/MM3 (ref 0–0.2)
BASOPHILS NFR BLD AUTO: 0.2 % (ref 0–1.5)
BUN BLD-MCNC: 13 MG/DL (ref 8–23)
BUN/CREAT SERPL: 9.4 (ref 7–25)
CALCIUM SPEC-SCNC: 9.1 MG/DL (ref 8.6–10.5)
CHLORIDE SERPL-SCNC: 100 MMOL/L (ref 98–107)
CO2 SERPL-SCNC: 33.7 MMOL/L (ref 22–29)
CREAT BLD-MCNC: 1.38 MG/DL (ref 0.57–1)
DEPRECATED RDW RBC AUTO: 50.1 FL (ref 37–54)
EOSINOPHIL # BLD AUTO: 0.31 10*3/MM3 (ref 0–0.7)
EOSINOPHIL NFR BLD AUTO: 5.3 % (ref 0.3–6.2)
ERYTHROCYTE [DISTWIDTH] IN BLOOD BY AUTOMATED COUNT: 14 % (ref 11.7–13)
GFR SERPL CREATININE-BSD FRML MDRD: 38 ML/MIN/1.73
GLUCOSE BLD-MCNC: 139 MG/DL (ref 65–99)
HCT VFR BLD AUTO: 36 % (ref 35.6–45.5)
HGB BLD-MCNC: 10.4 G/DL (ref 11.9–15.5)
IMM GRANULOCYTES # BLD: 0.1 10*3/MM3 (ref 0–0.03)
IMM GRANULOCYTES NFR BLD: 1.7 % (ref 0–0.5)
LYMPHOCYTES # BLD AUTO: 1.45 10*3/MM3 (ref 0.9–4.8)
LYMPHOCYTES NFR BLD AUTO: 24.7 % (ref 19.6–45.3)
MCH RBC QN AUTO: 28.5 PG (ref 26.9–32)
MCHC RBC AUTO-ENTMCNC: 28.9 G/DL (ref 32.4–36.3)
MCV RBC AUTO: 98.6 FL (ref 80.5–98.2)
MONOCYTES # BLD AUTO: 0.54 10*3/MM3 (ref 0.2–1.2)
MONOCYTES NFR BLD AUTO: 9.2 % (ref 5–12)
NEUTROPHILS # BLD AUTO: 3.47 10*3/MM3 (ref 1.9–8.1)
NEUTROPHILS NFR BLD AUTO: 58.9 % (ref 42.7–76)
PHOSPHATE SERPL-MCNC: 4.5 MG/DL (ref 2.5–4.5)
PLATELET # BLD AUTO: 216 10*3/MM3 (ref 140–500)
PMV BLD AUTO: 10.4 FL (ref 6–12)
POTASSIUM BLD-SCNC: 3.6 MMOL/L (ref 3.5–5.2)
RBC # BLD AUTO: 3.65 10*6/MM3 (ref 3.9–5.2)
SODIUM BLD-SCNC: 146 MMOL/L (ref 136–145)
WBC NRBC COR # BLD: 5.88 10*3/MM3 (ref 4.5–10.7)

## 2017-04-08 PROCEDURE — 80069 RENAL FUNCTION PANEL: CPT | Performed by: INTERNAL MEDICINE

## 2017-04-08 PROCEDURE — 94799 UNLISTED PULMONARY SVC/PX: CPT

## 2017-04-08 PROCEDURE — 85025 COMPLETE CBC W/AUTO DIFF WBC: CPT | Performed by: INTERNAL MEDICINE

## 2017-04-08 RX ADMIN — AMLODIPINE BESYLATE 5 MG: 5 TABLET ORAL at 09:06

## 2017-04-08 RX ADMIN — DIVALPROEX SODIUM 125 MG: 125 CAPSULE, COATED PELLETS ORAL at 09:06

## 2017-04-08 RX ADMIN — DULOXETINE HYDROCHLORIDE 30 MG: 30 CAPSULE, DELAYED RELEASE ORAL at 09:06

## 2017-04-08 RX ADMIN — ACETAMINOPHEN 650 MG: 325 TABLET ORAL at 09:06

## 2017-04-08 RX ADMIN — POTASSIUM CHLORIDE 40 MEQ: 750 CAPSULE, EXTENDED RELEASE ORAL at 12:10

## 2017-04-08 RX ADMIN — PANTOPRAZOLE SODIUM 40 MG: 40 TABLET, DELAYED RELEASE ORAL at 09:06

## 2017-04-08 RX ADMIN — BUMETANIDE 1 MG: 1 TABLET ORAL at 09:06

## 2017-04-08 RX ADMIN — LEVOTHYROXINE SODIUM 50 MCG: 50 TABLET ORAL at 09:06

## 2017-04-08 RX ADMIN — ROPINIROLE HYDROCHLORIDE 0.25 MG: 0.25 TABLET, FILM COATED ORAL at 09:06

## 2017-04-08 NOTE — PLAN OF CARE
Problem: Patient Care Overview (Adult)  Goal: Plan of Care Review  Outcome: Ongoing (interventions implemented as appropriate)    04/08/17 0246   Coping/Psychosocial Response Interventions   Plan Of Care Reviewed With patient   Patient Care Overview   Progress improving   Outcome Evaluation   Outcome Summary/Follow up Plan CONTINUE TO MONITOR VS, LABS, PT TO GO TO REHAB IN AM         Problem: Respiratory Insufficiency (Adult)  Goal: Identify Related Risk Factors and Signs and Symptoms  Outcome: Outcome(s) achieved Date Met:  04/08/17 04/08/17 0246   Respiratory Insufficiency   Related Risk Factors (Respiratory Insufficiency) activity intolerance   Signs and Symptoms (Respiratory Insufficiency) abnormal breath sounds       Goal: Acid/Base Balance  Outcome: Outcome(s) achieved Date Met:  04/08/17  Goal: Effective Ventilation  Outcome: Outcome(s) achieved Date Met:  04/08/17 04/08/17 0246   Respiratory Insufficiency (Adult)   Effective Ventilation achieves outcome         Problem: Fall Risk (Adult)  Goal: Absence of Falls  Outcome: Ongoing (interventions implemented as appropriate)    04/08/17 0246   Fall Risk (Adult)   Absence of Falls making progress toward outcome         Problem: Pressure Ulcer (Adult)  Goal: Signs and Symptoms of Listed Potential Problems Will be Absent or Manageable (Pressure Ulcer)  Outcome: Ongoing (interventions implemented as appropriate)

## 2017-04-08 NOTE — PROGRESS NOTES
[address]    4/8/2017    Patient ID:  Name:  Maryjane Amaral  MRN:  [id number]  1945  72 y.o.  female            CC/Reason for visit: Follow-up for acute respiratory failure, COPD, cellulitis versus hematoma    HPI: Patient denies any new complaints.  Waiting for her noninvasive Trilogy home volume ventilation system. She is using BIPAP at night here in the hospital    Vitals:  Vitals:    04/08/17 0020 04/08/17 0720 04/08/17 0827 04/08/17 0900   BP:  137/63     BP Location:  Left arm     Patient Position:  Lying     Pulse: 89 85  90   Resp: 21 18     Temp:  98 °F (36.7 °C)     TempSrc:  Oral     SpO2: 92% 95% 94% 94%   Weight:       Height:               Body mass index is 72.16 kg/(m^2).    Intake/Output Summary (Last 24 hours) at 04/08/17 1035  Last data filed at 04/07/17 1919   Gross per 24 hour   Intake              360 ml   Output                0 ml   Net              360 ml       Exam:  GEN:  No distress, morbidly obese  EYES:   EOM-i, anicteric sclera bilat  ENT:    External ears/nose normal, OP clear  NECK:  Supple, midline trachea  LUNGS: Diminished breath sounds throughout, but no wheezing, nonlabored breathing  CV:  Normal S1S2, without murmur, 3+ edema both ankles  ABD:  Non tender, morbidly obese    Scheduled meds:      amLODIPine 5 mg Oral Daily   bumetanide 1 mg Oral Daily   Divalproex Sodium 125 mg Oral Daily   DULoxetine 30 mg Oral Daily   levothyroxine 50 mcg Oral Q AM   pantoprazole 40 mg Oral Q AM   rOPINIRole 0.25 mg Oral TID     IV meds:                           Data Review:   I reviewed the patient's medications and new clinical results.  Lab Results   Component Value Date    CALCIUM 9.1 04/08/2017    PHOS 4.5 04/08/2017       Results from last 7 days  Lab Units 04/08/17  0524 04/07/17  0451 04/06/17  0520   SODIUM mmol/L 146* 145 143   POTASSIUM mmol/L 3.6 3.5 3.6   CHLORIDE mmol/L 100 98 97*   TOTAL CO2 mmol/L 33.7* 35.4* 36.8*   BUN mg/dL 13 11 12    CREATININE mg/dL 1.38* 1.28* 1.51*   GLUCOSE mg/dL 139* 124* 119*   CALCIUM mg/dL 9.1 9.0 9.1   WBC 10*3/mm3 5.88 6.14 6.04   HEMOGLOBIN g/dL 10.4* 10.6* 10.7*   PLATELETS 10*3/mm3 216 225 216         ASSESSMENT:   Principal Problem:    Acute respiratory failure with hypercapnia   COPD  Cellulitis versus hematoma  Super obesity  Acute kidney injury    PLAN:  We will send the patient back to nursing home tomorrow.  Hopefully with Trilogy machine     Trilogy setting   AVAPS Auto  Rate 14  Target Tidal volume 500 cc    Call Dr King for any further questions    Week end coverage for Dr Christine Fermin MD  4/8/2017

## 2017-04-08 NOTE — PROGRESS NOTES
Continued Stay Note  [facility]     Patient Name: Maryjane Amaral  MRN: [id number]  Today's Date: 4/8/2017    Admit Date: 3/26/2017          Discharge Plan       04/08/17 1023    Case Management/Social Work Plan    Plan [facility]    Patient/Family In Agreement With Plan yes    Additional Comments S/W MAYI Velazco on 4N and she states that [facility] did not have to trilogy machine yesterday and patient was not able to d/c.  Called and s/w [place] and she states they have received the machine, nurse will need to make sure the orders are in the notes for the settings.  [facility] can accept patient today.  Notified MAYI Velazco.  MAYI Baires, CCP              Discharge Codes     None        Expected Discharge Date and Time     Expected Discharge Date Expected Discharge Time    Apr 6, 2017             Tracie Menjivar RN

## 2017-04-11 NOTE — PROGRESS NOTES
Continued Stay Note  [facility]     Patient Name: Maryjane Amaral  MRN: [id number]  Today's Date: 4/11/2017    Admit Date: 3/26/2017          Discharge Plan       04/11/17 1627    Final Note    Final Note DC via ambulance to facility              Discharge Codes       04/11/17 1627    Discharge Codes    Discharge Codes 03  Discharged/transferred to skilled nursing facility (SNF) with Medicare certification in anticipation of skilled care        Expected Discharge Date and Time     Expected Discharge Date Expected Discharge Time    Apr 6, 2017             Aviva Woodward RN

## 2019-11-17 ENCOUNTER — OUTSIDE FACILITY SERVICE (OUTPATIENT)
Dept: PULMONOLOGY | Facility: CLINIC | Age: 74
End: 2019-11-17

## 2019-11-17 PROCEDURE — 99221 1ST HOSP IP/OBS SF/LOW 40: CPT | Performed by: NURSE PRACTITIONER

## 2019-11-18 ENCOUNTER — OUTSIDE FACILITY SERVICE (OUTPATIENT)
Dept: PULMONOLOGY | Facility: CLINIC | Age: 74
End: 2019-11-18

## 2019-11-18 PROCEDURE — 99233 SBSQ HOSP IP/OBS HIGH 50: CPT | Performed by: INTERNAL MEDICINE

## 2019-11-19 ENCOUNTER — OUTSIDE FACILITY SERVICE (OUTPATIENT)
Dept: PULMONOLOGY | Facility: CLINIC | Age: 74
End: 2019-11-19

## 2019-11-19 PROCEDURE — 99233 SBSQ HOSP IP/OBS HIGH 50: CPT | Performed by: INTERNAL MEDICINE

## 2019-11-20 ENCOUNTER — OUTSIDE FACILITY SERVICE (OUTPATIENT)
Dept: PULMONOLOGY | Facility: CLINIC | Age: 74
End: 2019-11-20

## 2019-11-20 PROCEDURE — 99233 SBSQ HOSP IP/OBS HIGH 50: CPT | Performed by: INTERNAL MEDICINE

## 2019-11-21 ENCOUNTER — OUTSIDE FACILITY SERVICE (OUTPATIENT)
Dept: PULMONOLOGY | Facility: CLINIC | Age: 74
End: 2019-11-21

## 2019-11-21 PROCEDURE — 99233 SBSQ HOSP IP/OBS HIGH 50: CPT | Performed by: INTERNAL MEDICINE

## 2019-11-22 ENCOUNTER — OUTSIDE FACILITY SERVICE (OUTPATIENT)
Dept: PULMONOLOGY | Facility: CLINIC | Age: 74
End: 2019-11-22

## 2019-11-22 PROCEDURE — 99233 SBSQ HOSP IP/OBS HIGH 50: CPT | Performed by: INTERNAL MEDICINE

## 2019-11-23 ENCOUNTER — OUTSIDE FACILITY SERVICE (OUTPATIENT)
Dept: PULMONOLOGY | Facility: CLINIC | Age: 74
End: 2019-11-23

## 2019-11-23 PROCEDURE — 99233 SBSQ HOSP IP/OBS HIGH 50: CPT | Performed by: NURSE PRACTITIONER

## 2019-11-24 ENCOUNTER — OUTSIDE FACILITY SERVICE (OUTPATIENT)
Dept: PULMONOLOGY | Facility: CLINIC | Age: 74
End: 2019-11-24

## 2019-11-24 PROCEDURE — 99233 SBSQ HOSP IP/OBS HIGH 50: CPT | Performed by: NURSE PRACTITIONER

## 2019-11-25 ENCOUNTER — OUTSIDE FACILITY SERVICE (OUTPATIENT)
Dept: PULMONOLOGY | Facility: CLINIC | Age: 74
End: 2019-11-25

## 2019-11-25 PROCEDURE — 99233 SBSQ HOSP IP/OBS HIGH 50: CPT | Performed by: INTERNAL MEDICINE

## 2019-11-26 ENCOUNTER — OUTSIDE FACILITY SERVICE (OUTPATIENT)
Dept: PULMONOLOGY | Facility: CLINIC | Age: 74
End: 2019-11-26

## 2019-11-26 PROCEDURE — 99233 SBSQ HOSP IP/OBS HIGH 50: CPT | Performed by: NURSE PRACTITIONER

## 2019-11-27 ENCOUNTER — OUTSIDE FACILITY SERVICE (OUTPATIENT)
Dept: PULMONOLOGY | Facility: CLINIC | Age: 74
End: 2019-11-27

## 2019-11-27 PROCEDURE — 99233 SBSQ HOSP IP/OBS HIGH 50: CPT | Performed by: INTERNAL MEDICINE

## 2019-11-28 ENCOUNTER — OUTSIDE FACILITY SERVICE (OUTPATIENT)
Dept: PULMONOLOGY | Facility: CLINIC | Age: 74
End: 2019-11-28

## 2019-11-28 PROCEDURE — 99232 SBSQ HOSP IP/OBS MODERATE 35: CPT | Performed by: NURSE PRACTITIONER

## 2019-11-29 ENCOUNTER — OUTSIDE FACILITY SERVICE (OUTPATIENT)
Dept: PULMONOLOGY | Facility: CLINIC | Age: 74
End: 2019-11-29

## 2019-11-29 PROCEDURE — 99232 SBSQ HOSP IP/OBS MODERATE 35: CPT | Performed by: NURSE PRACTITIONER

## 2019-11-30 ENCOUNTER — OUTSIDE FACILITY SERVICE (OUTPATIENT)
Dept: PULMONOLOGY | Facility: CLINIC | Age: 74
End: 2019-11-30

## 2019-11-30 PROCEDURE — 99233 SBSQ HOSP IP/OBS HIGH 50: CPT | Performed by: NURSE PRACTITIONER

## 2019-12-01 ENCOUNTER — OUTSIDE FACILITY SERVICE (OUTPATIENT)
Dept: PULMONOLOGY | Facility: CLINIC | Age: 74
End: 2019-12-01

## 2019-12-01 PROCEDURE — 99233 SBSQ HOSP IP/OBS HIGH 50: CPT | Performed by: NURSE PRACTITIONER

## 2019-12-02 ENCOUNTER — OUTSIDE FACILITY SERVICE (OUTPATIENT)
Dept: PULMONOLOGY | Facility: CLINIC | Age: 74
End: 2019-12-02

## 2019-12-02 PROCEDURE — 99233 SBSQ HOSP IP/OBS HIGH 50: CPT | Performed by: INTERNAL MEDICINE

## 2019-12-03 ENCOUNTER — OUTSIDE FACILITY SERVICE (OUTPATIENT)
Dept: PULMONOLOGY | Facility: CLINIC | Age: 74
End: 2019-12-03

## 2019-12-03 PROCEDURE — 99233 SBSQ HOSP IP/OBS HIGH 50: CPT | Performed by: INTERNAL MEDICINE

## 2019-12-04 ENCOUNTER — OUTSIDE FACILITY SERVICE (OUTPATIENT)
Dept: PULMONOLOGY | Facility: CLINIC | Age: 74
End: 2019-12-04

## 2019-12-04 PROCEDURE — 99233 SBSQ HOSP IP/OBS HIGH 50: CPT | Performed by: INTERNAL MEDICINE

## 2019-12-05 ENCOUNTER — OUTSIDE FACILITY SERVICE (OUTPATIENT)
Dept: PULMONOLOGY | Facility: CLINIC | Age: 74
End: 2019-12-05

## 2019-12-05 PROCEDURE — 99233 SBSQ HOSP IP/OBS HIGH 50: CPT | Performed by: INTERNAL MEDICINE

## 2019-12-06 ENCOUNTER — OUTSIDE FACILITY SERVICE (OUTPATIENT)
Dept: PULMONOLOGY | Facility: CLINIC | Age: 74
End: 2019-12-06

## 2019-12-06 PROCEDURE — 99233 SBSQ HOSP IP/OBS HIGH 50: CPT | Performed by: INTERNAL MEDICINE

## 2019-12-07 ENCOUNTER — OUTSIDE FACILITY SERVICE (OUTPATIENT)
Dept: PULMONOLOGY | Facility: CLINIC | Age: 74
End: 2019-12-07

## 2019-12-07 PROCEDURE — 99232 SBSQ HOSP IP/OBS MODERATE 35: CPT | Performed by: NURSE PRACTITIONER

## 2019-12-09 ENCOUNTER — OUTSIDE FACILITY SERVICE (OUTPATIENT)
Dept: PULMONOLOGY | Facility: CLINIC | Age: 74
End: 2019-12-09

## 2019-12-09 PROCEDURE — 99232 SBSQ HOSP IP/OBS MODERATE 35: CPT | Performed by: INTERNAL MEDICINE
